# Patient Record
Sex: MALE | Race: WHITE | Employment: OTHER | URBAN - METROPOLITAN AREA
[De-identification: names, ages, dates, MRNs, and addresses within clinical notes are randomized per-mention and may not be internally consistent; named-entity substitution may affect disease eponyms.]

---

## 2017-01-01 ENCOUNTER — HOSPITAL ENCOUNTER (EMERGENCY)
Facility: HOSPITAL | Age: 82
Discharge: HOME | End: 2017-01-01
Attending: EMERGENCY MEDICINE
Payer: MEDICARE

## 2017-01-01 ENCOUNTER — APPOINTMENT (OUTPATIENT)
Dept: GENERAL RADIOLOGY | Facility: HOSPITAL | Age: 82
End: 2017-01-01
Attending: EMERGENCY MEDICINE
Payer: MEDICARE

## 2017-01-01 VITALS
RESPIRATION RATE: 16 BRPM | HEIGHT: 72 IN | WEIGHT: 214 LBS | BODY MASS INDEX: 28.99 KG/M2 | OXYGEN SATURATION: 95 % | TEMPERATURE: 97 F | SYSTOLIC BLOOD PRESSURE: 129 MMHG | DIASTOLIC BLOOD PRESSURE: 66 MMHG | HEART RATE: 50 BPM

## 2017-01-01 VITALS
SYSTOLIC BLOOD PRESSURE: 117 MMHG | HEART RATE: 59 BPM | HEIGHT: 67 IN | DIASTOLIC BLOOD PRESSURE: 53 MMHG | TEMPERATURE: 98 F | WEIGHT: 215 LBS | RESPIRATION RATE: 16 BRPM | OXYGEN SATURATION: 96 % | BODY MASS INDEX: 33.74 KG/M2

## 2017-01-01 DIAGNOSIS — R60.9 PERIPHERAL EDEMA: ICD-10-CM

## 2017-01-01 DIAGNOSIS — T83.9XXA FOLEY CATHETER PROBLEM, INITIAL ENCOUNTER (HCC): Primary | ICD-10-CM

## 2017-01-01 DIAGNOSIS — R31.9 HEMATURIA: Primary | ICD-10-CM

## 2017-01-01 DIAGNOSIS — Z97.8 CHRONIC INDWELLING FOLEY CATHETER: ICD-10-CM

## 2017-01-01 PROCEDURE — 80048 BASIC METABOLIC PNL TOTAL CA: CPT | Performed by: EMERGENCY MEDICINE

## 2017-01-01 PROCEDURE — 81001 URINALYSIS AUTO W/SCOPE: CPT | Performed by: EMERGENCY MEDICINE

## 2017-01-01 PROCEDURE — 87186 SC STD MICRODIL/AGAR DIL: CPT | Performed by: EMERGENCY MEDICINE

## 2017-01-01 PROCEDURE — 87086 URINE CULTURE/COLONY COUNT: CPT | Performed by: EMERGENCY MEDICINE

## 2017-01-01 PROCEDURE — 36415 COLL VENOUS BLD VENIPUNCTURE: CPT

## 2017-01-01 PROCEDURE — 87077 CULTURE AEROBIC IDENTIFY: CPT | Performed by: EMERGENCY MEDICINE

## 2017-01-01 PROCEDURE — 83880 ASSAY OF NATRIURETIC PEPTIDE: CPT | Performed by: EMERGENCY MEDICINE

## 2017-01-01 PROCEDURE — 99283 EMERGENCY DEPT VISIT LOW MDM: CPT

## 2017-01-01 PROCEDURE — 85025 COMPLETE CBC W/AUTO DIFF WBC: CPT | Performed by: EMERGENCY MEDICINE

## 2017-01-01 PROCEDURE — 85027 COMPLETE CBC AUTOMATED: CPT | Performed by: EMERGENCY MEDICINE

## 2017-01-01 PROCEDURE — 71010 XR CHEST AP PORTABLE  (CPT=71010): CPT

## 2017-01-01 PROCEDURE — 85007 BL SMEAR W/DIFF WBC COUNT: CPT | Performed by: EMERGENCY MEDICINE

## 2017-01-01 PROCEDURE — 51702 INSERT TEMP BLADDER CATH: CPT

## 2017-01-01 RX ORDER — LIDOCAINE HYDROCHLORIDE 20 MG/ML
10 JELLY TOPICAL ONCE
Status: DISCONTINUED | OUTPATIENT
Start: 2017-01-01 | End: 2017-01-01

## 2017-01-01 RX ORDER — LIDOCAINE HYDROCHLORIDE 20 MG/ML
10 JELLY TOPICAL ONCE
Status: COMPLETED | OUTPATIENT
Start: 2017-01-01 | End: 2017-01-01

## 2017-01-04 ENCOUNTER — TELEPHONE (OUTPATIENT)
Dept: SURGERY | Facility: CLINIC | Age: 82
End: 2017-01-04

## 2017-01-04 NOTE — TELEPHONE ENCOUNTER
Pts son states pt needs to have catheter changed, last time it was changed was 12/01/16 at [address] [place] [address] ED, requesting an appt sooner than next available.  Pt was seeing another urologist but suffered a fall and is unable to see urologist due to being in a [facility]

## 2017-01-05 NOTE — TELEPHONE ENCOUNTER
I called pt's son back, Allyn Saez and I informed him of the msg from [address] [facility] and we arranged a N/V appt for tues. 1/10 at 3pm and I told him that we will find an o/v appt for pt to see pvk at that time.

## 2017-01-05 NOTE — TELEPHONE ENCOUNTER
Spoke with pt's son and determined that the he has been seeing a Dr. Sebastian Earing out of [facility] and had his holbrook cath was last changed there on 12/1/16.  He states that the office there is very poorly situated and the rooms are very small and crampe

## 2017-01-05 NOTE — TELEPHONE ENCOUNTER
Okay to call patient scheduled for nurse visit catheter change, then visit with me in the near future. Please note that I saw patient at [address] [place] [address] is consult 7/15/16, and at that time, his urologist was Dr. Castillo Romero, [facility].

## 2017-01-10 ENCOUNTER — NURSE ONLY (OUTPATIENT)
Dept: SURGERY | Facility: CLINIC | Age: 82
End: 2017-01-10

## 2017-01-10 DIAGNOSIS — Z97.8 FOLEY CATHETER IN PLACE: Primary | ICD-10-CM

## 2017-01-10 PROCEDURE — 51702 INSERT TEMP BLADDER CATH: CPT | Performed by: UROLOGY

## 2017-01-10 PROCEDURE — G0463 HOSPITAL OUTPT CLINIC VISIT: HCPCS | Performed by: UROLOGY

## 2017-01-10 NOTE — PROGRESS NOTES
Patient's name and  verified. With assistance from patient's son, Yaz Luong, patient transferred from his wheelchair to procedure table. Patient's existing holbrook catheter is noted to be 16 Fr. Urine in holbrook bag is noted to be clear yellow.   Patient's exi

## 2017-01-16 ENCOUNTER — OFFICE VISIT (OUTPATIENT)
Dept: SURGERY | Facility: CLINIC | Age: 82
End: 2017-01-16

## 2017-01-16 VITALS
HEART RATE: 67 BPM | SYSTOLIC BLOOD PRESSURE: 110 MMHG | RESPIRATION RATE: 16 BRPM | DIASTOLIC BLOOD PRESSURE: 64 MMHG | TEMPERATURE: 98 F | OXYGEN SATURATION: 97 %

## 2017-01-16 DIAGNOSIS — N39.0 RECURRENT UTI: ICD-10-CM

## 2017-01-16 DIAGNOSIS — N40.1 BENIGN NON-NODULAR PROSTATIC HYPERPLASIA WITH LOWER URINARY TRACT SYMPTOMS: Primary | ICD-10-CM

## 2017-01-16 DIAGNOSIS — R33.9 URINARY RETENTION: ICD-10-CM

## 2017-01-16 DIAGNOSIS — N31.9 NEUROGENIC BLADDER: ICD-10-CM

## 2017-01-16 DIAGNOSIS — R31.0 GROSS HEMATURIA: ICD-10-CM

## 2017-01-16 DIAGNOSIS — N40.2 PROSTATE NODULE: ICD-10-CM

## 2017-01-16 PROCEDURE — 99214 OFFICE O/P EST MOD 30 MIN: CPT | Performed by: UROLOGY

## 2017-01-16 PROCEDURE — G0463 HOSPITAL OUTPT CLINIC VISIT: HCPCS | Performed by: UROLOGY

## 2017-01-16 RX ORDER — AZITHROMYCIN 500 MG/1
500 TABLET, FILM COATED ORAL DAILY
COMMUNITY
End: 2018-01-01

## 2017-01-16 RX ORDER — OXYBUTYNIN CHLORIDE 5 MG/1
5 TABLET ORAL 3 TIMES DAILY
COMMUNITY
End: 2018-01-01

## 2017-01-16 RX ORDER — DOXEPIN HYDROCHLORIDE 50 MG/1
1 CAPSULE ORAL DAILY
COMMUNITY

## 2017-01-16 RX ORDER — LIDOCAINE 50 MG/G
PATCH TOPICAL EVERY 24 HOURS
COMMUNITY
End: 2018-01-01

## 2017-01-16 RX ORDER — AMOXICILLIN AND CLAVULANATE POTASSIUM 875; 125 MG/1; MG/1
1 TABLET, FILM COATED ORAL 2 TIMES DAILY
COMMUNITY

## 2017-01-16 RX ORDER — POLYETHYLENE GLYCOL 3350 17 G/17G
17 POWDER, FOR SOLUTION ORAL DAILY
COMMUNITY

## 2017-01-16 RX ORDER — DOCUSATE SODIUM 100 MG/1
100 CAPSULE, LIQUID FILLED ORAL 2 TIMES DAILY
COMMUNITY

## 2017-01-16 NOTE — PATIENT INSTRUCTIONS
1.  Please continue finasteride 5 mg daily    2. Please return to office in approximately 3 weeks to have Negrete catheter change by my nurses.       DURING NEXT NURSING VISIT BEGINNING OF FEBRUARY 2017, SPECIFICALLY TO USE A STRAIGHT STANDARD NEGRETE CATHETER

## 2017-01-16 NOTE — PROGRESS NOTES
HPI:    Patient ID: Kandis Del Rio is a 80year old male. HPI     History provided by the patient and his son. 1. Urinary retention   Onset: Patient has had a holbrook since April 2016.  Patient has been visiting our office monthly for holbrook catheter ch Allyn Saez. April 2016 urinary retention; holbrook catheter has been inserted; urologist Dr. Bettye Vargas.  7/17/2016 although physical exam is suspicous for prostatic malignancy, at patient's age of 80 with multiple comorbidities, active gastrointestinal bleed, p Rfl:    multivitamin Oral Tab Take 1 tablet by mouth daily. Disp:  Rfl:    lidocaine 5 % External Patch Place onto the skin daily. Disp:  Rfl:    PEG 3350 Oral Powd Pack Take 17 g by mouth daily.  Disp:  Rfl:    psyllium 28 % Oral Powd Pack Take 1 packet b every 4 (four) hours as needed for Pain. Disp:  Rfl:    HYDROcodone-acetaminophen 5-325 MG Oral Tab Take 1 tablet by mouth every 6 (six) hours as needed for Pain. Disp:  Rfl:    ARTIFICIAL TEAR SOLUTION OP Apply 1 drop to eye as needed.    Disp:  Rfl:    lo bladder. I spent 25 minutes with patient, and majority of this time was spent discussing treatment options, answering questions about treatment and coordinating care.        ASSESSMENT/PLAN:   1. 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 Patient and his son politely declined SOPHY today and chose observation for now. I fully discussed benefits, risks and alternatives to the treatment plan. Patient Instructions and Treatment Plan    1.   Please continue cecy

## 2017-02-03 ENCOUNTER — NURSE ONLY (OUTPATIENT)
Dept: SURGERY | Facility: CLINIC | Age: 82
End: 2017-02-03

## 2017-02-03 DIAGNOSIS — R33.9 URINARY RETENTION: Primary | ICD-10-CM

## 2017-02-03 PROCEDURE — G0463 HOSPITAL OUTPT CLINIC VISIT: HCPCS | Performed by: UROLOGY

## 2017-02-03 NOTE — PROGRESS NOTES
Pt presents in a w/c, accompanied by son. With assistance of son, and nurse Lisbeth Couch, pt was brought into exam room and safely transferred on to exam table.  Pt was then draped for modesty, and current indwelling holbrook catheter balloon was deflated of it's enti

## 2017-04-27 NOTE — ED NOTES
Resting comfortably with family at bedside awaiting disposition. Offers no complaints.  Clear yellow urine via holbrook

## 2017-04-27 NOTE — ED NOTES
16F holbrook removed per MD order. 35cc fluid removed from balloon. Checked to ensure that no residual air or fluids were present. Cath removed, tip intact. Pt tolerated well. Pt c/o suprapubic soreness. heavily soiled diaper changed, collin-care provided.  Pt d

## 2017-04-27 NOTE — ED NOTES
Report to [facility] staff.  Discharged with indwelling holbrook intact with clear yellow returns

## 2017-04-27 NOTE — ED NOTES
Discharged into care of [facility] for transport home with plan to follow up with PCP as indicated. Alert and interactive. Hemodynamically stable. Richard intact with clear yellow urine.

## 2017-04-29 NOTE — PROGRESS NOTES
Nausea and Vomiting: Care Instructions  Your Care Instructions    When you are nauseated, you may feel weak and sweaty and notice a lot of saliva in your mouth. Nausea often leads to vomiting. Most of the time you do not need to worry about nausea and vomiting, but they can be signs of other illnesses. Two common causes of nausea and vomiting are stomach flu and food poisoning. Nausea and vomiting from viral stomach flu will usually start to improve within 24 hours. Nausea and vomiting from food poisoning may last from 12 to 48 hours. The doctor has checked you carefully, but problems can develop later. If you notice any problems or new symptoms, get medical treatment right away. Follow-up care is a key part of your treatment and safety. Be sure to make and go to all appointments, and call your doctor if you are having problems. It's also a good idea to know your test results and keep a list of the medicines you take. How can you care for yourself at home? · To prevent dehydration, drink plenty of fluids, enough so that your urine is light yellow or clear like water. Choose water and other caffeine-free clear liquids until you feel better. If you have kidney, heart, or liver disease and have to limit fluids, talk with your doctor before you increase the amount of fluids you drink. · Rest in bed until you feel better. · When you are able to eat, try clear soups, mild foods, and liquids until all symptoms are gone for 12 to 48 hours. Other good choices include dry toast, crackers, cooked cereal, and gelatin dessert, such as Jell-O. When should you call for help? Call 911 anytime you think you may need emergency care. For example, call if:  · You passed out (lost consciousness). Call your doctor now or seek immediate medical care if:  · You have symptoms of dehydration, such as:  ¨ Dry eyes and a dry mouth. ¨ Passing only a little dark urine.   ¨ Feeling thirstier than usual.  · You have new or worsening Quick Note:    No further intervention needed  ______ belly pain. · You have a new or higher fever. · You vomit blood or what looks like coffee grounds. Watch closely for changes in your health, and be sure to contact your doctor if:  · You have ongoing nausea and vomiting. · Your vomiting is getting worse. · Your vomiting lasts longer than 2 days. · You are not getting better as expected. Where can you learn more? Go to http://riaz-antonia.info/. Enter 25 [id number] in the search box to learn more about \"Nausea and Vomiting: Care Instructions. \"  Current as of: May 27, 2016  Content Version: 11.1  © 1337-2428 [employer]. Care instructions adapted under license by [employer] (which disclaims liability or warranty for this information). If you have questions about a medical condition or this instruction, always ask your healthcare professional. [address] any warranty or liability for your use of this information.

## 2017-04-29 NOTE — ED PROVIDER NOTES
Patient Seen in: [facility] AND [facility] Emergency Department    History   Patient presents with:  Cath Tube Problem (gastrointestinal, urinary, integumentary)    Stated Complaint:     HPI    Patient complains of holbrook catheter not working.   Also having swelli Tab,  Take 81 mg by mouth daily. Cholecalciferol (VITAMIN D3) 20115 UNITS Oral Cap,  Take 1 capsule by mouth daily. finasteride 5 MG Oral Tab,  Take 5 mg by mouth daily. furosemide 40 MG Oral Tab,  Take 40 mg by mouth 2 (two) times daily.  Take 40 mg 04/27/17 1512 95 %   O2 Device 04/27/17 1512 None (Room air)       Current:/53 mmHg  Pulse 59  Temp(Src) 98 °F (36.7 °C)  Resp 16  Ht 170.2 cm (5' 7\")  Wt 97.523 kg  BMI 33.67 kg/m2  SpO2 96%   PULSE OX Nl on room air    GENERAL: awake,alert no dist DIFFERENTIAL WITH PLATELET    Narrative: The following orders were created for panel order CBC WITH DIFFERENTIAL WITH PLATELET.   Procedure                               Abnormality         Status                     ---------

## 2017-06-15 NOTE — ED INITIAL ASSESSMENT (HPI)
Patient complain of hematuria that started today after the nurse at [address] [place] [address].

## 2017-06-15 NOTE — ED NOTES
[facility] notified of patient disposition and transport back to there. Spoke with Amy López RN and advised that patient will require catheter to be irrigated interrmittently throughout the night to avoid any further clots.

## 2017-06-15 NOTE — ED PROVIDER NOTES
Patient Seen in: [facility] AND [facility] Emergency Department    History   Patient presents with:  Bleeding (hematologic)    Stated Complaint: Hematuria    HPI  Patient is from skilled nursing at [facility] because of hematuria.   His Richard was changed today and (four) hours as needed for Wheezing. Amiodarone HCl 100 MG Oral Tab,  Take 200 mg by mouth daily. aspirin 81 MG Oral Tab,  Take 81 mg by mouth daily. Cholecalciferol (VITAMIN D3) 49869 UNITS Oral Cap,  Take 1 capsule by mouth daily.    finasteride 5 06/14/17 2049 128/64 mmHg   Pulse 06/14/17 2049 55   Resp 06/14/17 2049 16   Temp 06/14/17 2049 97.1 °F (36.2 °C)   Temp src 06/14/17 2049 Temporal   SpO2 06/14/17 2049 96 %   O2 Device 06/14/17 2336 None (Room air)       Current:/66 mmHg  Pulse 50 WBC Urine 299 (*)     RBC URINE 1571 (*)     Bacteria Urine Few (*)     All other components within normal limits   CBC W/ DIFFERENTIAL - Abnormal; Notable for the following:     HGB 12.3 (*)     HCT 37.9 (*)     MCV 79.8 (*)     MCH 25.9 (*)     RDW 17.5

## 2018-01-01 ENCOUNTER — APPOINTMENT (OUTPATIENT)
Dept: CV DIAGNOSTICS | Facility: HOSPITAL | Age: 83
DRG: 871 | End: 2018-01-01
Attending: HOSPITALIST
Payer: MEDICARE

## 2018-01-01 ENCOUNTER — APPOINTMENT (OUTPATIENT)
Dept: GENERAL RADIOLOGY | Facility: HOSPITAL | Age: 83
DRG: 871 | End: 2018-01-01
Attending: FAMILY MEDICINE
Payer: MEDICARE

## 2018-01-01 ENCOUNTER — APPOINTMENT (OUTPATIENT)
Dept: ULTRASOUND IMAGING | Facility: HOSPITAL | Age: 83
DRG: 871 | End: 2018-01-01
Attending: INTERNAL MEDICINE
Payer: MEDICARE

## 2018-01-01 ENCOUNTER — HOSPITAL ENCOUNTER (INPATIENT)
Facility: HOSPITAL | Age: 83
LOS: 7 days | DRG: 871 | End: 2018-01-01
Attending: EMERGENCY MEDICINE | Admitting: FAMILY MEDICINE
Payer: MEDICARE

## 2018-01-01 ENCOUNTER — SNF VISIT (OUTPATIENT)
Dept: INTERNAL MEDICINE CLINIC | Facility: SKILLED NURSING FACILITY | Age: 83
End: 2018-01-01

## 2018-01-01 ENCOUNTER — APPOINTMENT (OUTPATIENT)
Dept: GENERAL RADIOLOGY | Facility: HOSPITAL | Age: 83
DRG: 871 | End: 2018-01-01
Attending: INTERNAL MEDICINE
Payer: MEDICARE

## 2018-01-01 ENCOUNTER — APPOINTMENT (OUTPATIENT)
Dept: CT IMAGING | Facility: HOSPITAL | Age: 83
DRG: 871 | End: 2018-01-01
Attending: EMERGENCY MEDICINE
Payer: MEDICARE

## 2018-01-01 ENCOUNTER — HOSPITAL ENCOUNTER (INPATIENT)
Facility: HOSPITAL | Age: 83
LOS: 12 days | Discharge: NURSING, REHAB OR LONG-TERM CARE | DRG: 871 | End: 2018-01-01
Attending: EMERGENCY MEDICINE | Admitting: FAMILY MEDICINE
Payer: MEDICARE

## 2018-01-01 ENCOUNTER — APPOINTMENT (OUTPATIENT)
Dept: GENERAL RADIOLOGY | Facility: HOSPITAL | Age: 83
DRG: 871 | End: 2018-01-01
Attending: EMERGENCY MEDICINE
Payer: MEDICARE

## 2018-01-01 VITALS
TEMPERATURE: 98 F | HEIGHT: 68 IN | DIASTOLIC BLOOD PRESSURE: 69 MMHG | HEART RATE: 94 BPM | WEIGHT: 219 LBS | BODY MASS INDEX: 33.19 KG/M2 | SYSTOLIC BLOOD PRESSURE: 119 MMHG | OXYGEN SATURATION: 95 % | RESPIRATION RATE: 24 BRPM

## 2018-01-01 VITALS
TEMPERATURE: 98 F | HEART RATE: 64 BPM | SYSTOLIC BLOOD PRESSURE: 112 MMHG | OXYGEN SATURATION: 96 % | HEIGHT: 68 IN | DIASTOLIC BLOOD PRESSURE: 58 MMHG | BODY MASS INDEX: 33.14 KG/M2 | WEIGHT: 218.63 LBS | RESPIRATION RATE: 25 BRPM

## 2018-01-01 DIAGNOSIS — R33.8 BENIGN PROSTATIC HYPERPLASIA WITH URINARY RETENTION: ICD-10-CM

## 2018-01-01 DIAGNOSIS — I50.21 ACUTE SYSTOLIC CONGESTIVE HEART FAILURE (HCC): ICD-10-CM

## 2018-01-01 DIAGNOSIS — R09.02 HYPOXIA: ICD-10-CM

## 2018-01-01 DIAGNOSIS — R06.03 RESPIRATORY DISTRESS: Primary | ICD-10-CM

## 2018-01-01 DIAGNOSIS — J69.0 ASPIRATION PNEUMONIA OF LOWER LOBE, UNSPECIFIED ASPIRATION PNEUMONIA TYPE, UNSPECIFIED LATERALITY (HCC): ICD-10-CM

## 2018-01-01 DIAGNOSIS — A41.9 SEVERE SEPSIS (HCC): Primary | ICD-10-CM

## 2018-01-01 DIAGNOSIS — N30.00 ACUTE CYSTITIS WITHOUT HEMATURIA: ICD-10-CM

## 2018-01-01 DIAGNOSIS — N31.9 NEUROGENIC BLADDER: ICD-10-CM

## 2018-01-01 DIAGNOSIS — E87.5 HYPERKALEMIA: ICD-10-CM

## 2018-01-01 DIAGNOSIS — I48.0 PAROXYSMAL ATRIAL FIBRILLATION (HCC): ICD-10-CM

## 2018-01-01 DIAGNOSIS — I26.99 OTHER PULMONARY EMBOLISM WITHOUT ACUTE COR PULMONALE, UNSPECIFIED CHRONICITY (HCC): ICD-10-CM
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
## 2018-01-01 DIAGNOSIS — J96.01 ACUTE RESPIRATORY FAILURE WITH HYPOXIA (HCC): ICD-10-CM

## 2018-01-01 DIAGNOSIS — J18.9 NOSOCOMIAL PNEUMONIA: ICD-10-CM

## 2018-01-01 DIAGNOSIS — N40.1 BENIGN PROSTATIC HYPERPLASIA WITH URINARY RETENTION: ICD-10-CM

## 2018-01-01 DIAGNOSIS — I95.89 OTHER SPECIFIED HYPOTENSION: ICD-10-CM

## 2018-01-01 DIAGNOSIS — Z09 FOLLOW UP: ICD-10-CM

## 2018-01-01 DIAGNOSIS — R65.20 SEVERE SEPSIS (HCC): Primary | ICD-10-CM

## 2018-01-01 DIAGNOSIS — N28.9 ACUTE RENAL INSUFFICIENCY: ICD-10-CM

## 2018-01-01 LAB
ADENOVIRUS PCR:: NEGATIVE
ADENOVIRUS PCR:: NEGATIVE
ALBUMIN SERPL BCP-MCNC: 2.5 G/DL (ref 3.5–4.8)
ALBUMIN SERPL BCP-MCNC: 2.5 G/DL (ref 3.5–4.8)
ALBUMIN SERPL BCP-MCNC: 3.1 G/DL (ref 3.5–4.8)
ALBUMIN SERPL BCP-MCNC: 3.1 G/DL (ref 3.5–4.8)
ALBUMIN/GLOB SERPL: 0.8 {RATIO} (ref 1–2)
ALBUMIN/GLOB SERPL: 0.8 {RATIO} (ref 1–2)
ALBUMIN/GLOB SERPL: 0.9 {RATIO} (ref 1–2)
ALP SERPL-CCNC: 30 U/L (ref 32–100)
ALP SERPL-CCNC: 42 U/L (ref 32–100)
ALP SERPL-CCNC: 49 U/L (ref 32–100)
ALP SERPL-CCNC: 50 U/L (ref 32–100)
ALT SERPL-CCNC: 6 U/L (ref 17–63)
ALT SERPL-CCNC: 6 U/L (ref 17–63)
ALT SERPL-CCNC: <5 U/L (ref 17–63)
ALT SERPL-CCNC: <5 U/L (ref 17–63)
ANION GAP SERPL CALC-SCNC: 10 MMOL/L (ref 0–18)
ANION GAP SERPL CALC-SCNC: 11 MMOL/L (ref 0–18)
ANION GAP SERPL CALC-SCNC: 11 MMOL/L (ref 0–18)
ANION GAP SERPL CALC-SCNC: 12 MMOL/L (ref 0–18)
ANION GAP SERPL CALC-SCNC: 13 MMOL/L (ref 0–18)
ANION GAP SERPL CALC-SCNC: 5 MMOL/L (ref 0–18)
ANION GAP SERPL CALC-SCNC: 5 MMOL/L (ref 0–18)
ANION GAP SERPL CALC-SCNC: 6 MMOL/L (ref 0–18)
ANION GAP SERPL CALC-SCNC: 7 MMOL/L (ref 0–18)
ANION GAP SERPL CALC-SCNC: 7 MMOL/L (ref 0–18)
ANION GAP SERPL CALC-SCNC: 9 MMOL/L (ref 0–18)
ANION GAP SERPL CALC-SCNC: 9 MMOL/L (ref 0–18)
APTT PPP: 104.6 SECONDS (ref 23.2–35.3)
APTT PPP: 31.4 SECONDS (ref 23.2–35.3)
APTT PPP: 34.7 SECONDS (ref 23.2–35.3)
APTT PPP: >240 SECONDS (ref 23.2–35.3)
AST SERPL-CCNC: 13 U/L (ref 15–41)
AST SERPL-CCNC: 18 U/L (ref 15–41)
AST SERPL-CCNC: 19 U/L (ref 15–41)
AST SERPL-CCNC: 21 U/L (ref 15–41)
B PERT DNA SPEC QL NAA+PROBE: NEGATIVE
B PERT DNA SPEC QL NAA+PROBE: NEGATIVE
BASE EXCESS BLD CALC-SCNC: 0.4 MMOL/L (ref ?–2)
BASOPHILS # BLD: 0 K/UL (ref 0–0.2)
BASOPHILS # BLD: 0.1 K/UL (ref 0–0.2)
BASOPHILS NFR BLD: 0 %
BASOPHILS NFR BLD: 0 %
BASOPHILS NFR BLD: 1 %
BILIRUB DIRECT SERPL-MCNC: 0.1 MG/DL (ref 0–0.2)
BILIRUB SERPL-MCNC: 0.5 MG/DL (ref 0.3–1.2)
BILIRUB SERPL-MCNC: 0.5 MG/DL (ref 0.3–1.2)
BILIRUB SERPL-MCNC: 0.6 MG/DL (ref 0.3–1.2)
BILIRUB SERPL-MCNC: 0.7 MG/DL (ref 0.3–1.2)
BILIRUB UR QL: NEGATIVE
BILIRUB UR QL: NEGATIVE
BNP SERPL-MCNC: 128 PG/ML (ref 0–100)
BNP SERPL-MCNC: 69 PG/ML (ref 0–100)
BUN SERPL-MCNC: 22 MG/DL (ref 8–20)
BUN SERPL-MCNC: 22 MG/DL (ref 8–20)
BUN SERPL-MCNC: 25 MG/DL (ref 8–20)
BUN SERPL-MCNC: 26 MG/DL (ref 8–20)
BUN SERPL-MCNC: 29 MG/DL (ref 8–20)
BUN SERPL-MCNC: 29 MG/DL (ref 8–20)
BUN SERPL-MCNC: 31 MG/DL (ref 8–20)
BUN SERPL-MCNC: 31 MG/DL (ref 8–20)
BUN SERPL-MCNC: 33 MG/DL (ref 8–20)
BUN SERPL-MCNC: 34 MG/DL (ref 8–20)
BUN SERPL-MCNC: 41 MG/DL (ref 8–20)
BUN SERPL-MCNC: 50 MG/DL (ref 8–20)
BUN SERPL-MCNC: 53 MG/DL (ref 8–20)
BUN SERPL-MCNC: 53 MG/DL (ref 8–20)
BUN/CREAT SERPL: 23.2 (ref 10–20)
BUN/CREAT SERPL: 23.8 (ref 10–20)
BUN/CREAT SERPL: 23.9 (ref 10–20)
BUN/CREAT SERPL: 25 (ref 10–20)
BUN/CREAT SERPL: 28.1 (ref 10–20)
BUN/CREAT SERPL: 28.3 (ref 10–20)
BUN/CREAT SERPL: 30.2 (ref 10–20)
BUN/CREAT SERPL: 31.6 (ref 10–20)
BUN/CREAT SERPL: 32.3 (ref 10–20)
BUN/CREAT SERPL: 32.3 (ref 10–20)
BUN/CREAT SERPL: 33.1 (ref 10–20)
BUN/CREAT SERPL: 39 (ref 10–20)
C PNEUM DNA SPEC QL NAA+PROBE: NEGATIVE
C PNEUM DNA SPEC QL NAA+PROBE: NEGATIVE
CALCIUM SERPL-MCNC: 8.6 MG/DL (ref 8.5–10.5)
CALCIUM SERPL-MCNC: 8.6 MG/DL (ref 8.5–10.5)
CALCIUM SERPL-MCNC: 8.7 MG/DL (ref 8.5–10.5)
CALCIUM SERPL-MCNC: 8.9 MG/DL (ref 8.5–10.5)
CALCIUM SERPL-MCNC: 9 MG/DL (ref 8.5–10.5)
CALCIUM SERPL-MCNC: 9.2 MG/DL (ref 8.5–10.5)
CALCIUM SERPL-MCNC: 9.2 MG/DL (ref 8.5–10.5)
CALCIUM SERPL-MCNC: 9.4 MG/DL (ref 8.5–10.5)
CALCIUM SERPL-MCNC: 9.5 MG/DL (ref 8.5–10.5)
CALCIUM SERPL-MCNC: 9.6 MG/DL (ref 8.5–10.5)
CALCIUM SERPL-MCNC: 9.6 MG/DL (ref 8.5–10.5)
CALCIUM SERPL-MCNC: 9.7 MG/DL (ref 8.5–10.5)
CALCIUM SERPL-MCNC: 9.8 MG/DL (ref 8.5–10.5)
CALCIUM SERPL-MCNC: 9.9 MG/DL (ref 8.5–10.5)
CHLORIDE SERPL-SCNC: 102 MMOL/L (ref 95–110)
CHLORIDE SERPL-SCNC: 103 MMOL/L (ref 95–110)
CHLORIDE SERPL-SCNC: 106 MMOL/L (ref 95–110)
CHLORIDE SERPL-SCNC: 107 MMOL/L (ref 95–110)
CHLORIDE SERPL-SCNC: 109 MMOL/L (ref 95–110)
CHLORIDE SERPL-SCNC: 110 MMOL/L (ref 95–110)
CHLORIDE SERPL-SCNC: 111 MMOL/L (ref 95–110)
CHLORIDE SERPL-SCNC: 113 MMOL/L (ref 95–110)
CHLORIDE SERPL-SCNC: 118 MMOL/L (ref 95–110)
CHLORIDE SERPL-SCNC: 120 MMOL/L (ref 95–110)
CHLORIDE SERPL-SCNC: 122 MMOL/L (ref 95–110)
CHLORIDE SERPL-SCNC: 122 MMOL/L (ref 95–110)
CHOLEST SERPL-MCNC: 207 MG/DL (ref 110–200)
CO2 SERPL-SCNC: 21 MMOL/L (ref 22–32)
CO2 SERPL-SCNC: 24 MMOL/L (ref 22–32)
CO2 SERPL-SCNC: 25 MMOL/L (ref 22–32)
CO2 SERPL-SCNC: 26 MMOL/L (ref 22–32)
CO2 SERPL-SCNC: 26 MMOL/L (ref 22–32)
CO2 SERPL-SCNC: 27 MMOL/L (ref 22–32)
CO2 SERPL-SCNC: 28 MMOL/L (ref 22–32)
CO2 SERPL-SCNC: 29 MMOL/L (ref 22–32)
COLOR UR: YELLOW
COLOR UR: YELLOW
CORONAVIRUS 229E PCR:: NEGATIVE
CORONAVIRUS 229E PCR:: NEGATIVE
CORONAVIRUS HKU1 PCR:: NEGATIVE
CORONAVIRUS HKU1 PCR:: NEGATIVE
CORONAVIRUS NL63 PCR:: NEGATIVE
CORONAVIRUS NL63 PCR:: NEGATIVE
CORONAVIRUS OC43 PCR:: NEGATIVE
CORONAVIRUS OC43 PCR:: NEGATIVE
CREAT SERPL-MCNC: 0.88 MG/DL (ref 0.5–1.5)
CREAT SERPL-MCNC: 0.89 MG/DL (ref 0.5–1.5)
CREAT SERPL-MCNC: 0.95 MG/DL (ref 0.5–1.5)
CREAT SERPL-MCNC: 0.96 MG/DL (ref 0.5–1.5)
CREAT SERPL-MCNC: 0.96 MG/DL (ref 0.5–1.5)
CREAT SERPL-MCNC: 1.05 MG/DL (ref 0.5–1.5)
CREAT SERPL-MCNC: 1.09 MG/DL (ref 0.5–1.5)
CREAT SERPL-MCNC: 1.2 MG/DL (ref 0.5–1.5)
CREAT SERPL-MCNC: 1.25 MG/DL (ref 0.5–1.5)
CREAT SERPL-MCNC: 1.3 MG/DL (ref 0.5–1.5)
CREAT SERPL-MCNC: 1.42 MG/DL (ref 0.5–1.5)
CREAT SERPL-MCNC: 1.58 MG/DL (ref 0.5–1.5)
CREAT SERPL-MCNC: 1.6 MG/DL (ref 0.5–1.5)
CREAT SERPL-MCNC: 1.64 MG/DL (ref 0.5–1.5)
D DIMER PPP FEU-MCNC: 3.01 MCG/ML (ref ?–0.91)
EOSINOPHIL # BLD: 0 K/UL (ref 0–0.7)
EOSINOPHIL # BLD: 0.1 K/UL (ref 0–0.7)
EOSINOPHIL # BLD: 0.1 K/UL (ref 0–0.7)
EOSINOPHIL # BLD: 0.2 K/UL (ref 0–0.7)
EOSINOPHIL NFR BLD: 0 %
EOSINOPHIL NFR BLD: 1 %
EOSINOPHIL NFR BLD: 2 %
EOSINOPHIL NFR BLD: 3 %
ERYTHROCYTE [DISTWIDTH] IN BLOOD BY AUTOMATED COUNT: 16.6 % (ref 11–15)
ERYTHROCYTE [DISTWIDTH] IN BLOOD BY AUTOMATED COUNT: 16.6 % (ref 11–15)
ERYTHROCYTE [DISTWIDTH] IN BLOOD BY AUTOMATED COUNT: 16.7 % (ref 11–15)
ERYTHROCYTE [DISTWIDTH] IN BLOOD BY AUTOMATED COUNT: 16.9 % (ref 11–15)
ERYTHROCYTE [DISTWIDTH] IN BLOOD BY AUTOMATED COUNT: 16.9 % (ref 11–15)
ERYTHROCYTE [DISTWIDTH] IN BLOOD BY AUTOMATED COUNT: 17 % (ref 11–15)
ERYTHROCYTE [DISTWIDTH] IN BLOOD BY AUTOMATED COUNT: 17.4 % (ref 11–15)
ERYTHROCYTE [DISTWIDTH] IN BLOOD BY AUTOMATED COUNT: 18 % (ref 11–15)
ERYTHROCYTE [DISTWIDTH] IN BLOOD BY AUTOMATED COUNT: 18 % (ref 11–15)
ERYTHROCYTE [DISTWIDTH] IN BLOOD BY AUTOMATED COUNT: 18.3 % (ref 11–15)
ERYTHROCYTE [DISTWIDTH] IN BLOOD BY AUTOMATED COUNT: 18.4 % (ref 11–15)
ERYTHROCYTE [DISTWIDTH] IN BLOOD BY AUTOMATED COUNT: 18.5 % (ref 11–15)
ERYTHROCYTE [DISTWIDTH] IN BLOOD BY AUTOMATED COUNT: 18.8 % (ref 11–15)
FLUAV H3 RNA SPEC QL NAA+PROBE: POSITIVE
FLUAV RNA SPEC QL NAA+PROBE: NEGATIVE
FLUBV RNA SPEC QL NAA+PROBE: NEGATIVE
FLUBV RNA SPEC QL NAA+PROBE: NEGATIVE
GLOBULIN PLAS-MCNC: 3.1 G/DL (ref 2.5–3.7)
GLOBULIN PLAS-MCNC: 3.2 G/DL (ref 2.5–3.7)
GLOBULIN PLAS-MCNC: 3.5 G/DL (ref 2.5–3.7)
GLUCOSE SERPL-MCNC: 101 MG/DL (ref 70–99)
GLUCOSE SERPL-MCNC: 102 MG/DL (ref 70–99)
GLUCOSE SERPL-MCNC: 103 MG/DL (ref 70–99)
GLUCOSE SERPL-MCNC: 104 MG/DL (ref 70–99)
GLUCOSE SERPL-MCNC: 105 MG/DL (ref 70–99)
GLUCOSE SERPL-MCNC: 114 MG/DL (ref 70–99)
GLUCOSE SERPL-MCNC: 117 MG/DL (ref 70–99)
GLUCOSE SERPL-MCNC: 118 MG/DL (ref 70–99)
GLUCOSE SERPL-MCNC: 127 MG/DL (ref 70–99)
GLUCOSE SERPL-MCNC: 146 MG/DL (ref 70–99)
GLUCOSE SERPL-MCNC: 147 MG/DL (ref 70–99)
GLUCOSE SERPL-MCNC: 154 MG/DL (ref 70–99)
GLUCOSE SERPL-MCNC: 180 MG/DL (ref 70–99)
GLUCOSE SERPL-MCNC: 95 MG/DL (ref 70–99)
GLUCOSE UR-MCNC: NEGATIVE MG/DL
GLUCOSE UR-MCNC: NEGATIVE MG/DL
HCO3 BLDA-SCNC: 26.4 MEQ/L (ref 21–27)
HCT VFR BLD AUTO: 31.8 % (ref 41–52)
HCT VFR BLD AUTO: 32.1 % (ref 41–52)
HCT VFR BLD AUTO: 32.7 % (ref 41–52)
HCT VFR BLD AUTO: 32.9 % (ref 41–52)
HCT VFR BLD AUTO: 34.3 % (ref 41–52)
HCT VFR BLD AUTO: 35.6 % (ref 41–52)
HCT VFR BLD AUTO: 35.8 % (ref 41–52)
HCT VFR BLD AUTO: 36 % (ref 41–52)
HCT VFR BLD AUTO: 36.8 % (ref 41–52)
HCT VFR BLD AUTO: 37.9 % (ref 41–52)
HCT VFR BLD AUTO: 39.7 % (ref 41–52)
HCT VFR BLD AUTO: 43 % (ref 41–52)
HCT VFR BLD AUTO: 45.3 % (ref 41–52)
HDLC SERPL-MCNC: 37 MG/DL
HGB BLD-MCNC: 10 G/DL (ref 13.5–17.5)
HGB BLD-MCNC: 10 G/DL (ref 13.5–17.5)
HGB BLD-MCNC: 10.4 G/DL (ref 13.5–17.5)
HGB BLD-MCNC: 10.5 G/DL (ref 13.5–17.5)
HGB BLD-MCNC: 11 G/DL (ref 13.5–17.5)
HGB BLD-MCNC: 11.2 G/DL (ref 13.5–17.5)
HGB BLD-MCNC: 11.4 G/DL (ref 13.5–17.5)
HGB BLD-MCNC: 11.5 G/DL (ref 13.5–17.5)
HGB BLD-MCNC: 11.6 G/DL (ref 13.5–17.5)
HGB BLD-MCNC: 12.1 G/DL (ref 13.5–17.5)
HGB BLD-MCNC: 12.7 G/DL (ref 13.5–17.5)
HGB BLD-MCNC: 13.4 G/DL (ref 13.5–17.5)
HGB BLD-MCNC: 14.3 G/DL (ref 13.5–17.5)
HYALINE CASTS #/AREA URNS AUTO: 7 /LPF
INR BLD: 1.2 (ref 0.9–1.2)
INR BLD: 1.2 (ref 0.9–1.2)
INR BLD: 1.6 (ref 0.9–1.2)
LACTATE SERPL-SCNC: 1.9 MMOL/L (ref 0.5–2.2)
LACTATE SERPL-SCNC: 2 MMOL/L (ref 0.5–2.2)
LACTATE SERPL-SCNC: 2.2 MMOL/L (ref 0.5–2.2)
LACTATE SERPL-SCNC: 2.4 MMOL/L (ref 0.5–2.2)
LACTATE SERPL-SCNC: 2.9 MMOL/L (ref 0.5–2.2)
LACTATE SERPL-SCNC: 3.2 MMOL/L (ref 0.5–2.2)
LACTATE SERPL-SCNC: 4.6 MMOL/L (ref 0.5–2.2)
LDLC SERPL CALC-MCNC: 150 MG/DL (ref 0–99)
LYMPHOCYTES # BLD: 0.3 K/UL (ref 1–4)
LYMPHOCYTES # BLD: 0.4 K/UL (ref 1–4)
LYMPHOCYTES # BLD: 0.5 K/UL (ref 1–4)
LYMPHOCYTES # BLD: 0.6 K/UL (ref 1–4)
LYMPHOCYTES # BLD: 1 K/UL (ref 1–4)
LYMPHOCYTES NFR BLD: 10 %
LYMPHOCYTES NFR BLD: 13 %
LYMPHOCYTES NFR BLD: 3 %
LYMPHOCYTES NFR BLD: 4 %
LYMPHOCYTES NFR BLD: 5 %
LYMPHOCYTES NFR BLD: 6 %
LYMPHOCYTES NFR BLD: 7 %
LYMPHOCYTES NFR BLD: 9 %
MAGNESIUM SERPL-MCNC: 2.4 MG/DL (ref 1.8–2.5)
MAGNESIUM SERPL-MCNC: 2.6 MG/DL (ref 1.8–2.5)
MCH RBC QN AUTO: 25.9 PG (ref 27–32)
MCH RBC QN AUTO: 25.9 PG (ref 27–32)
MCH RBC QN AUTO: 26.2 PG (ref 27–32)
MCH RBC QN AUTO: 26.3 PG (ref 27–32)
MCH RBC QN AUTO: 26.4 PG (ref 27–32)
MCH RBC QN AUTO: 26.4 PG (ref 27–32)
MCH RBC QN AUTO: 26.6 PG (ref 27–32)
MCH RBC QN AUTO: 26.7 PG (ref 27–32)
MCH RBC QN AUTO: 26.8 PG (ref 27–32)
MCHC RBC AUTO-ENTMCNC: 31 G/DL (ref 32–37)
MCHC RBC AUTO-ENTMCNC: 31.2 G/DL (ref 32–37)
MCHC RBC AUTO-ENTMCNC: 31.3 G/DL (ref 32–37)
MCHC RBC AUTO-ENTMCNC: 31.3 G/DL (ref 32–37)
MCHC RBC AUTO-ENTMCNC: 31.5 G/DL (ref 32–37)
MCHC RBC AUTO-ENTMCNC: 31.5 G/DL (ref 32–37)
MCHC RBC AUTO-ENTMCNC: 31.6 G/DL (ref 32–37)
MCHC RBC AUTO-ENTMCNC: 31.8 G/DL (ref 32–37)
MCHC RBC AUTO-ENTMCNC: 32 G/DL (ref 32–37)
MCHC RBC AUTO-ENTMCNC: 32 G/DL (ref 32–37)
MCHC RBC AUTO-ENTMCNC: 32.1 G/DL (ref 32–37)
MCHC RBC AUTO-ENTMCNC: 32.1 G/DL (ref 32–37)
MCHC RBC AUTO-ENTMCNC: 32.5 G/DL (ref 32–37)
MCV RBC AUTO: 81.5 FL (ref 80–100)
MCV RBC AUTO: 81.6 FL (ref 80–100)
MCV RBC AUTO: 81.8 FL (ref 80–100)
MCV RBC AUTO: 82 FL (ref 80–100)
MCV RBC AUTO: 82.3 FL (ref 80–100)
MCV RBC AUTO: 82.4 FL (ref 80–100)
MCV RBC AUTO: 83.3 FL (ref 80–100)
MCV RBC AUTO: 83.6 FL (ref 80–100)
MCV RBC AUTO: 83.7 FL (ref 80–100)
MCV RBC AUTO: 83.7 FL (ref 80–100)
MCV RBC AUTO: 84.5 FL (ref 80–100)
MCV RBC AUTO: 85.5 FL (ref 80–100)
MCV RBC AUTO: 85.6 FL (ref 80–100)
METAPNEUMOVIRUS PCR:: NEGATIVE
METAPNEUMOVIRUS PCR:: NEGATIVE
MODIFIED ALLEN TEST: POSITIVE
MONOCYTES # BLD: 0.3 K/UL (ref 0–1)
MONOCYTES # BLD: 0.4 K/UL (ref 0–1)
MONOCYTES # BLD: 0.4 K/UL (ref 0–1)
MONOCYTES # BLD: 0.5 K/UL (ref 0–1)
MONOCYTES # BLD: 0.6 K/UL (ref 0–1)
MONOCYTES # BLD: 0.6 K/UL (ref 0–1)
MONOCYTES # BLD: 0.7 K/UL (ref 0–1)
MONOCYTES # BLD: 0.7 K/UL (ref 0–1)
MONOCYTES NFR BLD: 10 %
MONOCYTES NFR BLD: 4 %
MONOCYTES NFR BLD: 4 %
MONOCYTES NFR BLD: 5 %
MONOCYTES NFR BLD: 6 %
MONOCYTES NFR BLD: 6 %
MONOCYTES NFR BLD: 7 %
MONOCYTES NFR BLD: 8 %
MONOCYTES NFR BLD: 8 %
MRSA DNA SPEC QL NAA+PROBE: NEGATIVE
MRSA DNA SPEC QL NAA+PROBE: NEGATIVE
MYCOPLASMA PNEUMONIA PCR:: NEGATIVE
MYCOPLASMA PNEUMONIA PCR:: NEGATIVE
NEUTROPHILS # BLD AUTO: 10 K/UL (ref 1.8–7.7)
NEUTROPHILS # BLD AUTO: 10.1 K/UL (ref 1.8–7.7)
NEUTROPHILS # BLD AUTO: 4.4 K/UL (ref 1.8–7.7)
NEUTROPHILS # BLD AUTO: 5.1 K/UL (ref 1.8–7.7)
NEUTROPHILS # BLD AUTO: 5.3 K/UL (ref 1.8–7.7)
NEUTROPHILS # BLD AUTO: 5.7 K/UL (ref 1.8–7.7)
NEUTROPHILS # BLD AUTO: 5.9 K/UL (ref 1.8–7.7)
NEUTROPHILS # BLD AUTO: 6 K/UL (ref 1.8–7.7)
NEUTROPHILS # BLD AUTO: 6.1 K/UL (ref 1.8–7.7)
NEUTROPHILS # BLD AUTO: 6.5 K/UL (ref 1.8–7.7)
NEUTROPHILS # BLD AUTO: 6.9 K/UL (ref 1.8–7.7)
NEUTROPHILS # BLD AUTO: 7.4 K/UL (ref 1.8–7.7)
NEUTROPHILS # BLD AUTO: 9.3 K/UL (ref 1.8–7.7)
NEUTROPHILS NFR BLD: 79 %
NEUTROPHILS NFR BLD: 80 %
NEUTROPHILS NFR BLD: 81 %
NEUTROPHILS NFR BLD: 84 %
NEUTROPHILS NFR BLD: 84 %
NEUTROPHILS NFR BLD: 85 %
NEUTROPHILS NFR BLD: 85 %
NEUTROPHILS NFR BLD: 86 %
NEUTROPHILS NFR BLD: 88 %
NEUTROPHILS NFR BLD: 89 %
NEUTROPHILS NFR BLD: 90 %
NEUTROPHILS NFR BLD: 90 %
NEUTROPHILS NFR BLD: 91 %
NITRITE UR QL STRIP.AUTO: NEGATIVE
NITRITE UR QL STRIP.AUTO: POSITIVE
NONHDLC SERPL-MCNC: 170 MG/DL
O2 CT BLD-SCNC: 17.6 VOL% (ref 15–23)
OSMOLALITY UR CALC.SUM OF ELEC: 293 MOSM/KG (ref 275–295)
OSMOLALITY UR CALC.SUM OF ELEC: 296 MOSM/KG (ref 275–295)
OSMOLALITY UR CALC.SUM OF ELEC: 299 MOSM/KG (ref 275–295)
OSMOLALITY UR CALC.SUM OF ELEC: 303 MOSM/KG (ref 275–295)
OSMOLALITY UR CALC.SUM OF ELEC: 307 MOSM/KG (ref 275–295)
OSMOLALITY UR CALC.SUM OF ELEC: 310 MOSM/KG (ref 275–295)
OSMOLALITY UR CALC.SUM OF ELEC: 314 MOSM/KG (ref 275–295)
OSMOLALITY UR CALC.SUM OF ELEC: 315 MOSM/KG (ref 275–295)
OSMOLALITY UR CALC.SUM OF ELEC: 315 MOSM/KG (ref 275–295)
OSMOLALITY UR CALC.SUM OF ELEC: 316 MOSM/KG (ref 275–295)
OSMOLALITY UR CALC.SUM OF ELEC: 316 MOSM/KG (ref 275–295)
OSMOLALITY UR CALC.SUM OF ELEC: 319 MOSM/KG (ref 275–295)
OSMOLALITY UR CALC.SUM OF ELEC: 323 MOSM/KG (ref 275–295)
OSMOLALITY UR CALC.SUM OF ELEC: 323 MOSM/KG (ref 275–295)
OXYGEN LITERS/MINUTE: 8 L/MIN
PARAINFLUENZA 1 PCR:: NEGATIVE
PARAINFLUENZA 1 PCR:: NEGATIVE
PARAINFLUENZA 2 PCR:: NEGATIVE
PARAINFLUENZA 2 PCR:: NEGATIVE
PARAINFLUENZA 3 PCR:: NEGATIVE
PARAINFLUENZA 3 PCR:: NEGATIVE
PARAINFLUENZA 4 PCR:: NEGATIVE
PARAINFLUENZA 4 PCR:: NEGATIVE
PCO2 BLDA: 51 MM HG (ref 35–45)
PH BLDA: 7.34 [PH] (ref 7.35–7.45)
PH UR: 5 [PH] (ref 5–8)
PH UR: 6 [PH] (ref 5–8)
PLATELET # BLD AUTO: 154 K/UL (ref 140–400)
PLATELET # BLD AUTO: 161 K/UL (ref 140–400)
PLATELET # BLD AUTO: 161 K/UL (ref 140–400)
PLATELET # BLD AUTO: 164 K/UL (ref 140–400)
PLATELET # BLD AUTO: 169 K/UL (ref 140–400)
PLATELET # BLD AUTO: 176 K/UL (ref 140–400)
PLATELET # BLD AUTO: 186 K/UL (ref 140–400)
PLATELET # BLD AUTO: 192 K/UL (ref 140–400)
PLATELET # BLD AUTO: 200 K/UL (ref 140–400)
PLATELET # BLD AUTO: 208 K/UL (ref 140–400)
PLATELET # BLD AUTO: 209 K/UL (ref 140–400)
PLATELET # BLD AUTO: 236 K/UL (ref 140–400)
PLATELET # BLD AUTO: 323 K/UL (ref 140–400)
PMV BLD AUTO: 10.4 FL (ref 7.4–10.3)
PMV BLD AUTO: 8.4 FL (ref 7.4–10.3)
PMV BLD AUTO: 8.5 FL (ref 7.4–10.3)
PMV BLD AUTO: 8.8 FL (ref 7.4–10.3)
PMV BLD AUTO: 9.1 FL (ref 7.4–10.3)
PMV BLD AUTO: 9.2 FL (ref 7.4–10.3)
PMV BLD AUTO: 9.3 FL (ref 7.4–10.3)
PMV BLD AUTO: 9.4 FL (ref 7.4–10.3)
PMV BLD AUTO: 9.4 FL (ref 7.4–10.3)
PMV BLD AUTO: 9.5 FL (ref 7.4–10.3)
PMV BLD AUTO: 9.5 FL (ref 7.4–10.3)
PMV BLD AUTO: 9.7 FL (ref 7.4–10.3)
PMV BLD AUTO: 9.8 FL (ref 7.4–10.3)
PO2 BLDA: 74 MM HG (ref 80–100)
PO2 SATN ADJ TO 0.5 BLD: 23 MMHG (ref 24–28)
POTASSIUM SERPL-SCNC: 3.2 MMOL/L (ref 3.3–5.1)
POTASSIUM SERPL-SCNC: 3.4 MMOL/L (ref 3.3–5.1)
POTASSIUM SERPL-SCNC: 3.4 MMOL/L (ref 3.3–5.1)
POTASSIUM SERPL-SCNC: 3.5 MMOL/L (ref 3.3–5.1)
POTASSIUM SERPL-SCNC: 3.6 MMOL/L (ref 3.3–5.1)
POTASSIUM SERPL-SCNC: 3.7 MMOL/L (ref 3.3–5.1)
POTASSIUM SERPL-SCNC: 3.8 MMOL/L (ref 3.3–5.1)
POTASSIUM SERPL-SCNC: 3.9 MMOL/L (ref 3.3–5.1)
POTASSIUM SERPL-SCNC: 3.9 MMOL/L (ref 3.3–5.1)
POTASSIUM SERPL-SCNC: 4 MMOL/L (ref 3.3–5.1)
POTASSIUM SERPL-SCNC: 4.1 MMOL/L (ref 3.3–5.1)
POTASSIUM SERPL-SCNC: 4.2 MMOL/L (ref 3.3–5.1)
POTASSIUM SERPL-SCNC: 4.8 MMOL/L (ref 3.3–5.1)
POTASSIUM SERPL-SCNC: 4.9 MMOL/L (ref 3.3–5.1)
POTASSIUM SERPL-SCNC: 5 MMOL/L (ref 3.3–5.1)
POTASSIUM SERPL-SCNC: 5.1 MMOL/L (ref 3.3–5.1)
PROCALCITONIN SERPL-MCNC: 0.68 NG/ML (ref ?–0.11)
PROT SERPL-MCNC: 5.6 G/DL (ref 5.9–8.4)
PROT SERPL-MCNC: 5.7 G/DL (ref 5.9–8.4)
PROT SERPL-MCNC: 6.5 G/DL (ref 5.9–8.4)
PROT SERPL-MCNC: 6.6 G/DL (ref 5.9–8.4)
PROT UR-MCNC: 100 MG/DL
PROT UR-MCNC: 30 MG/DL
PROTHROMBIN TIME: 15 SECONDS (ref 11.8–14.5)
PROTHROMBIN TIME: 15.1 SECONDS (ref 11.8–14.5)
PROTHROMBIN TIME: 18.2 SECONDS (ref 11.8–14.5)
PUNCTURE CHARGE: YES
RBC # BLD AUTO: 3.8 M/UL (ref 4.5–5.9)
RBC # BLD AUTO: 3.83 M/UL (ref 4.5–5.9)
RBC # BLD AUTO: 3.89 M/UL (ref 4.5–5.9)
RBC # BLD AUTO: 3.93 M/UL (ref 4.5–5.9)
RBC # BLD AUTO: 4.21 M/UL (ref 4.5–5.9)
RBC # BLD AUTO: 4.31 M/UL (ref 4.5–5.9)
RBC # BLD AUTO: 4.34 M/UL (ref 4.5–5.9)
RBC # BLD AUTO: 4.38 M/UL (ref 4.5–5.9)
RBC # BLD AUTO: 4.42 M/UL (ref 4.5–5.9)
RBC # BLD AUTO: 4.6 M/UL (ref 4.5–5.9)
RBC # BLD AUTO: 4.83 M/UL (ref 4.5–5.9)
RBC # BLD AUTO: 5.03 M/UL (ref 4.5–5.9)
RBC # BLD AUTO: 5.42 M/UL (ref 4.5–5.9)
RBC #/AREA URNS AUTO: 54 /HPF
RBC #/AREA URNS AUTO: 9 /HPF
RHINOVIRUS/ENTERO PCR:: NEGATIVE
RHINOVIRUS/ENTERO PCR:: NEGATIVE
RSV RNA SPEC QL NAA+PROBE: NEGATIVE
RSV RNA SPEC QL NAA+PROBE: NEGATIVE
SAO2 % BLDA: 96 % (ref 94–100)
SODIUM SERPL-SCNC: 137 MMOL/L (ref 136–144)
SODIUM SERPL-SCNC: 138 MMOL/L (ref 136–144)
SODIUM SERPL-SCNC: 141 MMOL/L (ref 136–144)
SODIUM SERPL-SCNC: 143 MMOL/L (ref 136–144)
SODIUM SERPL-SCNC: 144 MMOL/L (ref 136–144)
SODIUM SERPL-SCNC: 146 MMOL/L (ref 136–144)
SODIUM SERPL-SCNC: 148 MMOL/L (ref 136–144)
SODIUM SERPL-SCNC: 148 MMOL/L (ref 136–144)
SODIUM SERPL-SCNC: 151 MMOL/L (ref 136–144)
SODIUM SERPL-SCNC: 151 MMOL/L (ref 136–144)
SODIUM SERPL-SCNC: 153 MMOL/L (ref 136–144)
SODIUM SERPL-SCNC: 154 MMOL/L (ref 136–144)
SP GR UR STRIP: 1.02 (ref 1–1.03)
SP GR UR STRIP: 1.02 (ref 1–1.03)
TRIGL SERPL-MCNC: 99 MG/DL (ref 1–149)
TROPONIN I SERPL-MCNC: 0.02 NG/ML (ref ?–0.03)
TROPONIN I SERPL-MCNC: 0.04 NG/ML (ref ?–0.03)
TROPONIN I SERPL-MCNC: 0.05 NG/ML (ref ?–0.03)
TROPONIN I SERPL-MCNC: 0.05 NG/ML (ref ?–0.03)
UROBILINOGEN UR STRIP-ACNC: <2
UROBILINOGEN UR STRIP-ACNC: <2
VANCOMYCIN TROUGH SERPL-MCNC: 18.3 MCG/ML (ref 10–20)
VANCOMYCIN TROUGH SERPL-MCNC: 21.8 MCG/ML (ref 10–20)
VIT C UR-MCNC: 20 MG/DL
VIT C UR-MCNC: NEGATIVE MG/DL
WBC # BLD AUTO: 10.3 K/UL (ref 4–11)
WBC # BLD AUTO: 11.1 K/UL (ref 4–11)
WBC # BLD AUTO: 11.1 K/UL (ref 4–11)
WBC # BLD AUTO: 5.5 K/UL (ref 4–11)
WBC # BLD AUTO: 5.7 K/UL (ref 4–11)
WBC # BLD AUTO: 6.5 K/UL (ref 4–11)
WBC # BLD AUTO: 6.7 K/UL (ref 4–11)
WBC # BLD AUTO: 7 K/UL (ref 4–11)
WBC # BLD AUTO: 7.2 K/UL (ref 4–11)
WBC # BLD AUTO: 7.4 K/UL (ref 4–11)
WBC # BLD AUTO: 7.7 K/UL (ref 4–11)
WBC # BLD AUTO: 8.1 K/UL (ref 4–11)
WBC # BLD AUTO: 8.7 K/UL (ref 4–11)
WBC #/AREA URNS AUTO: 3892 /HPF
WBC #/AREA URNS AUTO: 88 /HPF

## 2018-01-01 PROCEDURE — 99232 SBSQ HOSP IP/OBS MODERATE 35: CPT | Performed by: INTERNAL MEDICINE

## 2018-01-01 PROCEDURE — 99233 SBSQ HOSP IP/OBS HIGH 50: CPT | Performed by: INTERNAL MEDICINE

## 2018-01-01 PROCEDURE — 71260 CT THORAX DX C+: CPT | Performed by: EMERGENCY MEDICINE

## 2018-01-01 PROCEDURE — 71045 X-RAY EXAM CHEST 1 VIEW: CPT | Performed by: FAMILY MEDICINE

## 2018-01-01 PROCEDURE — 71045 X-RAY EXAM CHEST 1 VIEW: CPT | Performed by: INTERNAL MEDICINE

## 2018-01-01 PROCEDURE — 71045 X-RAY EXAM CHEST 1 VIEW: CPT | Performed by: EMERGENCY MEDICINE

## 2018-01-01 PROCEDURE — 99291 CRITICAL CARE FIRST HOUR: CPT | Performed by: INTERNAL MEDICINE

## 2018-01-01 PROCEDURE — 99222 1ST HOSP IP/OBS MODERATE 55: CPT | Performed by: INTERNAL MEDICINE

## 2018-01-01 PROCEDURE — 51703 INSERT BLADDER CATH COMPLEX: CPT | Performed by: UROLOGY

## 2018-01-01 PROCEDURE — 02HV33Z INSERTION OF INFUSION DEVICE INTO SUPERIOR VENA CAVA, PERCUTANEOUS APPROACH: ICD-10-PCS | Performed by: INTERNAL MEDICINE

## 2018-01-01 PROCEDURE — 99309 SBSQ NF CARE MODERATE MDM 30: CPT | Performed by: NURSE PRACTITIONER

## 2018-01-01 PROCEDURE — 02HV33Z INSERTION OF INFUSION DEVICE INTO SUPERIOR VENA CAVA, PERCUTANEOUS APPROACH: ICD-10-PCS | Performed by: FAMILY MEDICINE

## 2018-01-01 PROCEDURE — B5181ZA FLUOROSCOPY OF SUPERIOR VENA CAVA USING LOW OSMOLAR CONTRAST, GUIDANCE: ICD-10-PCS | Performed by: FAMILY MEDICINE

## 2018-01-01 PROCEDURE — 74230 X-RAY XM SWLNG FUNCJ C+: CPT | Performed by: FAMILY MEDICINE

## 2018-01-01 PROCEDURE — 99223 1ST HOSP IP/OBS HIGH 75: CPT | Performed by: UROLOGY

## 2018-01-01 PROCEDURE — 93970 EXTREMITY STUDY: CPT | Performed by: INTERNAL MEDICINE

## 2018-01-01 PROCEDURE — 93306 TTE W/DOPPLER COMPLETE: CPT | Performed by: HOSPITALIST

## 2018-01-01 PROCEDURE — 99310 SBSQ NF CARE HIGH MDM 45: CPT | Performed by: CLINICAL NURSE SPECIALIST

## 2018-01-01 RX ORDER — 0.9 % SODIUM CHLORIDE 0.9 %
VIAL (ML) INJECTION
Status: COMPLETED
Start: 2018-01-01 | End: 2018-01-01

## 2018-01-01 RX ORDER — HEPARIN SODIUM AND DEXTROSE 10000; 5 [USP'U]/100ML; G/100ML
18 INJECTION INTRAVENOUS ONCE
Status: DISCONTINUED | OUTPATIENT
Start: 2018-01-01 | End: 2018-01-01

## 2018-01-01 RX ORDER — ALBUTEROL SULFATE 2.5 MG/3ML
2.5 SOLUTION RESPIRATORY (INHALATION)
Status: DISCONTINUED | OUTPATIENT
Start: 2018-01-01 | End: 2018-01-01

## 2018-01-01 RX ORDER — DOCUSATE SODIUM 100 MG/1
100 CAPSULE, LIQUID FILLED ORAL 2 TIMES DAILY
Status: DISCONTINUED | OUTPATIENT
Start: 2018-01-01 | End: 2018-01-01

## 2018-01-01 RX ORDER — FINASTERIDE 5 MG/1
5 TABLET, FILM COATED ORAL DAILY
Status: DISCONTINUED | OUTPATIENT
Start: 2018-01-01 | End: 2018-01-01

## 2018-01-01 RX ORDER — FUROSEMIDE 20 MG/1
20 TABLET ORAL
Status: DISCONTINUED | OUTPATIENT
Start: 2018-01-01 | End: 2018-01-01

## 2018-01-01 RX ORDER — TRAMADOL HYDROCHLORIDE 50 MG/1
50 TABLET ORAL EVERY 12 HOURS PRN
Status: DISCONTINUED | OUTPATIENT
Start: 2018-01-01 | End: 2018-01-01

## 2018-01-01 RX ORDER — LIDOCAINE HYDROCHLORIDE 20 MG/ML
20 JELLY TOPICAL AS NEEDED
Status: DISCONTINUED | OUTPATIENT
Start: 2018-01-01 | End: 2018-01-01

## 2018-01-01 RX ORDER — FUROSEMIDE 40 MG/1
40 TABLET ORAL
Status: DISCONTINUED | OUTPATIENT
Start: 2018-01-01 | End: 2018-01-01

## 2018-01-01 RX ORDER — HEPARIN SODIUM AND DEXTROSE 10000; 5 [USP'U]/100ML; G/100ML
INJECTION INTRAVENOUS CONTINUOUS
Status: DISCONTINUED | OUTPATIENT
Start: 2018-01-01 | End: 2018-01-01

## 2018-01-01 RX ORDER — SODIUM CHLORIDE 0.9 % (FLUSH) 0.9 %
10 SYRINGE (ML) INJECTION AS NEEDED
Status: DISCONTINUED | OUTPATIENT
Start: 2018-01-01 | End: 2018-01-01

## 2018-01-01 RX ORDER — PREGABALIN 50 MG/1
50 CAPSULE ORAL 2 TIMES DAILY
Status: DISCONTINUED | OUTPATIENT
Start: 2018-01-01 | End: 2018-01-01

## 2018-01-01 RX ORDER — HEPARIN SODIUM 1000 [USP'U]/ML
80 INJECTION, SOLUTION INTRAVENOUS; SUBCUTANEOUS ONCE
Status: COMPLETED | OUTPATIENT
Start: 2018-01-01 | End: 2018-01-01

## 2018-01-01 RX ORDER — SODIUM CHLORIDE 0.9 % (FLUSH) 0.9 %
3 SYRINGE (ML) INJECTION AS NEEDED
Status: DISCONTINUED | OUTPATIENT
Start: 2018-01-01 | End: 2018-01-01

## 2018-01-01 RX ORDER — HEPARIN SODIUM AND DEXTROSE 10000; 5 [USP'U]/100ML; G/100ML
18 INJECTION INTRAVENOUS ONCE
Status: COMPLETED | OUTPATIENT
Start: 2018-01-01 | End: 2018-01-01

## 2018-01-01 RX ORDER — CARBIDOPA AND LEVODOPA 25; 100 MG/1; MG/1
4 TABLET, ORALLY DISINTEGRATING ORAL 3 TIMES DAILY
Status: DISCONTINUED | OUTPATIENT
Start: 2018-01-01 | End: 2018-01-01

## 2018-01-01 RX ORDER — METOPROLOL SUCCINATE 25 MG/1
25 TABLET, EXTENDED RELEASE ORAL DAILY
COMMUNITY

## 2018-01-01 RX ORDER — SODIUM CHLORIDE 9 MG/ML
INJECTION, SOLUTION INTRAVENOUS
Status: COMPLETED
Start: 2018-01-01 | End: 2018-01-01

## 2018-01-01 RX ORDER — LISINOPRIL 2.5 MG/1
2.5 TABLET ORAL DAILY
Status: DISCONTINUED | OUTPATIENT
Start: 2018-01-01 | End: 2018-01-01

## 2018-01-01 RX ORDER — POLYETHYLENE GLYCOL 3350 17 G/17G
17 POWDER, FOR SOLUTION ORAL DAILY
Status: DISCONTINUED | OUTPATIENT
Start: 2018-01-01 | End: 2018-01-01

## 2018-01-01 RX ORDER — DEXTROSE AND SODIUM CHLORIDE 5; .45 G/100ML; G/100ML
INJECTION, SOLUTION INTRAVENOUS CONTINUOUS
Status: DISCONTINUED | OUTPATIENT
Start: 2018-01-01 | End: 2018-01-01

## 2018-01-01 RX ORDER — ALBUTEROL SULFATE 90 UG/1
1 AEROSOL, METERED RESPIRATORY (INHALATION) EVERY 4 HOURS PRN
Status: DISCONTINUED | OUTPATIENT
Start: 2018-01-01 | End: 2018-01-01

## 2018-01-01 RX ORDER — FUROSEMIDE 10 MG/ML
20 INJECTION INTRAMUSCULAR; INTRAVENOUS
Status: DISCONTINUED | OUTPATIENT
Start: 2018-01-01 | End: 2018-01-01

## 2018-01-01 RX ORDER — AMIODARONE HYDROCHLORIDE 200 MG/1
200 TABLET ORAL DAILY
Status: DISCONTINUED | OUTPATIENT
Start: 2018-01-01 | End: 2018-01-01

## 2018-01-01 RX ORDER — MORPHINE SULFATE 20 MG/ML
5 SOLUTION ORAL
Status: DISCONTINUED | OUTPATIENT
Start: 2018-01-01 | End: 2018-01-01

## 2018-01-01 RX ORDER — CARBIDOPA AND LEVODOPA 25; 100 MG/1; MG/1
1 TABLET, ORALLY DISINTEGRATING ORAL 3 TIMES DAILY
Status: DISCONTINUED | OUTPATIENT
Start: 2018-01-01 | End: 2018-01-01

## 2018-01-01 RX ORDER — METOPROLOL SUCCINATE 50 MG/1
50 TABLET, EXTENDED RELEASE ORAL DAILY
Status: DISCONTINUED | OUTPATIENT
Start: 2018-01-01 | End: 2018-01-01

## 2018-01-01 RX ORDER — OXYBUTYNIN CHLORIDE 5 MG/1
5 TABLET ORAL 2 TIMES DAILY
Status: DISCONTINUED | OUTPATIENT
Start: 2018-01-01 | End: 2018-01-01

## 2018-01-01 RX ORDER — DOXEPIN HYDROCHLORIDE 50 MG/1
1 CAPSULE ORAL DAILY
Status: DISCONTINUED | OUTPATIENT
Start: 2018-01-01 | End: 2018-01-01

## 2018-01-01 RX ORDER — ASPIRIN 81 MG/1
81 TABLET ORAL DAILY
Status: DISCONTINUED | OUTPATIENT
Start: 2018-01-01 | End: 2018-01-01

## 2018-01-01 RX ORDER — SODIUM CHLORIDE 9 MG/ML
INJECTION, SOLUTION INTRAVENOUS
Status: DISPENSED
Start: 2018-01-01 | End: 2018-01-01

## 2018-01-01 RX ORDER — FUROSEMIDE 40 MG/1
40 TABLET ORAL DAILY
Qty: 30 TABLET | Refills: 0 | Status: SHIPPED | OUTPATIENT
Start: 2018-01-01

## 2018-01-01 RX ORDER — POTASSIUM CHLORIDE 20 MEQ/1
20 TABLET, EXTENDED RELEASE ORAL DAILY
Status: DISCONTINUED | OUTPATIENT
Start: 2018-01-01 | End: 2018-01-01

## 2018-01-01 RX ORDER — METOPROLOL SUCCINATE 50 MG/1
50 TABLET, EXTENDED RELEASE ORAL DAILY
COMMUNITY

## 2018-01-01 RX ORDER — SODIUM CHLORIDE 9 MG/ML
125 INJECTION, SOLUTION INTRAVENOUS CONTINUOUS
Status: DISCONTINUED | OUTPATIENT
Start: 2018-01-01 | End: 2018-01-01

## 2018-01-01 RX ORDER — FUROSEMIDE 10 MG/ML
40 INJECTION INTRAMUSCULAR; INTRAVENOUS
Status: DISCONTINUED | OUTPATIENT
Start: 2018-01-01 | End: 2018-01-01

## 2018-01-01 RX ORDER — HEPARIN SODIUM 1000 [USP'U]/ML
80 INJECTION, SOLUTION INTRAVENOUS; SUBCUTANEOUS ONCE
Status: DISCONTINUED | OUTPATIENT
Start: 2018-01-01 | End: 2018-01-01

## 2018-01-01 RX ORDER — AMOXICILLIN AND CLAVULANATE POTASSIUM 875; 125 MG/1; MG/1
1 TABLET, FILM COATED ORAL EVERY 12 HOURS SCHEDULED
Status: DISCONTINUED | OUTPATIENT
Start: 2018-01-01 | End: 2018-01-01

## 2018-01-01 RX ORDER — ARIPIPRAZOLE 15 MG/1
10 TABLET ORAL DAILY
Status: DISCONTINUED | OUTPATIENT
Start: 2018-01-01 | End: 2018-01-01

## 2018-01-01 RX ORDER — OXYBUTYNIN CHLORIDE 5 MG/1
5 TABLET ORAL 2 TIMES DAILY
Qty: 60 TABLET | Refills: 0 | Status: SHIPPED | OUTPATIENT
Start: 2018-01-01

## 2018-01-01 RX ORDER — PANTOPRAZOLE SODIUM 20 MG/1
20 TABLET, DELAYED RELEASE ORAL
Status: DISCONTINUED | OUTPATIENT
Start: 2018-01-01 | End: 2018-01-01

## 2018-01-01 RX ORDER — ALBUTEROL SULFATE 2.5 MG/3ML
2.5 SOLUTION RESPIRATORY (INHALATION) EVERY 6 HOURS PRN
Status: DISCONTINUED | OUTPATIENT
Start: 2018-01-01 | End: 2018-01-01

## 2018-01-01 RX ORDER — ACETAMINOPHEN 650 MG/1
650 SUPPOSITORY RECTAL EVERY 6 HOURS PRN
Status: DISCONTINUED | OUTPATIENT
Start: 2018-01-01 | End: 2018-01-01

## 2018-01-01 RX ORDER — OSELTAMIVIR PHOSPHATE 75 MG/1
75 CAPSULE ORAL 2 TIMES DAILY
Status: DISCONTINUED | OUTPATIENT
Start: 2018-01-01 | End: 2018-01-01

## 2018-01-01 RX ORDER — ARIPIPRAZOLE 15 MG/1
20 TABLET ORAL DAILY
Status: DISCONTINUED | OUTPATIENT
Start: 2018-01-01 | End: 2018-01-01

## 2018-01-01 RX ORDER — ALBUTEROL SULFATE 2.5 MG/3ML
2.5 SOLUTION RESPIRATORY (INHALATION) ONCE
Status: DISCONTINUED | OUTPATIENT
Start: 2018-01-01 | End: 2018-01-01

## 2018-01-01 RX ORDER — AMOXICILLIN AND CLAVULANATE POTASSIUM 875; 125 MG/1; MG/1
1 TABLET, FILM COATED ORAL EVERY 12 HOURS SCHEDULED
Qty: 8 TABLET | Refills: 0 | Status: SHIPPED | OUTPATIENT
Start: 2018-01-01 | End: 2018-01-01

## 2018-01-01 RX ORDER — LIDOCAINE HYDROCHLORIDE 10 MG/ML
0.5 INJECTION, SOLUTION INFILTRATION; PERINEURAL ONCE AS NEEDED
Status: ACTIVE | OUTPATIENT
Start: 2018-01-01 | End: 2018-01-01

## 2018-01-01 RX ORDER — SODIUM CHLORIDE 9 MG/ML
INJECTION, SOLUTION INTRAVENOUS CONTINUOUS
Status: DISCONTINUED | OUTPATIENT
Start: 2018-01-01 | End: 2018-01-01

## 2018-01-01 RX ORDER — WARFARIN SODIUM 5 MG/1
2.5 TABLET ORAL NIGHTLY
Status: DISCONTINUED | OUTPATIENT
Start: 2018-01-01 | End: 2018-01-01

## 2018-01-01 RX ORDER — MORPHINE SULFATE 20 MG/ML
2.6 SOLUTION ORAL
Status: DISCONTINUED | OUTPATIENT
Start: 2018-01-01 | End: 2018-01-01

## 2018-01-01 RX ORDER — ASPIRIN 81 MG/1
81 TABLET, CHEWABLE ORAL DAILY
Status: DISCONTINUED | OUTPATIENT
Start: 2018-01-01 | End: 2018-01-01

## 2018-01-01 RX ORDER — CARBIDOPA AND LEVODOPA 25; 100 MG/1; MG/1
3.5 TABLET, ORALLY DISINTEGRATING ORAL 3 TIMES DAILY
Status: DISCONTINUED | OUTPATIENT
Start: 2018-01-01 | End: 2018-01-01

## 2018-01-12 PROBLEM — R09.02 HYPOXIA: Status: ACTIVE | Noted: 2018-01-01

## 2018-01-12 PROBLEM — J69.0 ASPIRATION PNEUMONIA OF LOWER LOBE, UNSPECIFIED ASPIRATION PNEUMONIA TYPE, UNSPECIFIED LATERALITY (HCC): Status: ACTIVE | Noted: 2018-01-01

## 2018-01-12 PROBLEM — I26.99 OTHER PULMONARY EMBOLISM WITHOUT ACUTE COR PULMONALE, UNSPECIFIED CHRONICITY (HCC): Status: ACTIVE | Noted: 2018-01-01

## 2018-01-12 PROBLEM — I50.21 ACUTE SYSTOLIC CONGESTIVE HEART FAILURE (HCC): Status: ACTIVE | Noted: 2018-01-01

## 2018-01-12 PROBLEM — R06.03 RESPIRATORY DISTRESS: Status: ACTIVE | Noted: 2018-01-01

## 2018-01-12 PROBLEM — I95.89 OTHER SPECIFIED HYPOTENSION: Status: ACTIVE | Noted: 2018-01-01

## 2018-01-12 NOTE — ED PROVIDER NOTES
Patient Seen in: [facility] AND [facility] Emergency Department    History   Patient presents with:  Dyspnea ANURAG SOB (respiratory)    Stated Complaint: hypotension, hypoxia    HPI    Patient is a 80-year-old male from nursing home who presents with hypoxia and h 34.97 kg/m²         Physical Exam    GENERAL: moderate distress, awake and alert  HEENT: MMM, EOMI, PERRL  Neck: supple, non tender.  No jvd  CV: RRR, no murmurs  Resp: +coarse rhonchi b/l with retractions and mild tachypnea  Ab: soft, nontender, no distens Lymphocyte Absolute 0.3 (*)     All other components within normal limits   LACTIC ACID, PLASMA - Normal   ED/MRSA SCREEN BY PCR-CC - Normal   CBC WITH DIFFERENTIAL WITH PLATELET    Narrative:      The following orders were created for panel order CBC WITH upper lobe. Abrupt termination of the left lower lobe bronchus with left lower lobe atelectasis and volume loss within the left hemithorax. This may be secondary to mucous plugging.  However underlying endobronchial lesion should be excluded via bronchosco (primary encounter diagnosis)  Aspiration pneumonia of lower lobe, unspecified aspiration pneumonia type, unspecified laterality ([facility] [address]  Acute systolic congestive heart failure ([address]  Other specified hypotension  Hypoxia  Other pulmonary embolism without ac

## 2018-01-12 NOTE — ED INITIAL ASSESSMENT (HPI)
Patient presents to ER via medics from [facility] after being found to be hypoxic and hypotensive today. Patient typically wears 2L at the NH and his O2 sat was in the upper 80s. Hx CHF.

## 2018-01-12 NOTE — ED NOTES
MD aware of blood pressure; watch BP for now. May need to start on vasopressors. Unable to give fluids due to patient's hx of CHF.

## 2018-01-12 NOTE — CONSULTS
Pulmonary/Critical Care Consult Note    HPI:   Luis Covarrubias is a 80year old male with Patient presents with:  Dyspnea ANURAG SOB (respiratory)    Shaquille Gallo MD      Pt is a 81 yo with parkinson's, dysphagia, wheelchair bound and dependent for all tra daily. Disp:  Rfl:    Albuterol Sulfate  (90 BASE) MCG/ACT Inhalation Aero Soln Inhale 1 puff into the lungs every 4 (four) hours as needed for Wheezing. Disp:  Rfl:    Amiodarone HCl 100 MG Oral Tab Take 200 mg by mouth daily.    Disp:  Rfl:    aspi dextrose 5 % 100 mL ADD-vantage 4.5 g Intravenous Once           Allergies:  No Known Allergies    Social History:  Social History    Marital status: Single              Spouse name:                       Years of education:                 Number of child hold for now   CXR in AM\   Speech eval   Npo until less resp distress    CHF  +HFpEF  Mild inc BNP  Plan follow I/O   Richard   Consider lasix       Dysphagia  Plan speech eval    Parkinson's  Non ambulatory   Denies dementia  Plan cont current Rx    DVT px

## 2018-01-13 NOTE — H&P
[facility]    PATIENT'S NAME: Diana Lopez   ATTENDING PHYSICIAN: Monika Olmstead MD   PATIENT ACCOUNT#:   [id number]    LOCATION:  [place] [address]  MEDICAL RECORD #:   [id number]       YOB: 1926  ADMISSION DATE:       0 Positive cough off and on, mostly when he eats. No dysuria. Positive weakness, positive drooling, and a chronic Richard. PHYSICAL EXAMINATION:    VITAL SIGNS:  Blood pressure 87/45, pulse 79, respirations 29, temperature 98.5 degrees Fahrenheit.   GENE

## 2018-01-13 NOTE — PROGRESS NOTES
[facility] Dosing Service  Antibiotic Dosing    Zoe Austin is a 80year old male for whom pharmacy is dosing Zosyn for treatment of  Pneumonia. .     Allergies: has No Known Allergies.     Vitals: /62   Pulse 68   Temp 98.5 °F (36.9 °C) (Oral)

## 2018-01-13 NOTE — PROGRESS NOTES
[facility]    Progress Note    Raulito Bardales Patient Status:  Inpatient    1926 MRN [id number]   Location [facility] 2W/SW Attending Wolfgang Hernandez MD   Hosp Day # 1 PCP Syeda Daniel MD     Subjective:     Unable to p 114 (H) 01/12/2018   CA 9.7 01/12/2018   ALB 3.5 09/29/2016   BILT 0.6 09/29/2016   TP 6.5 09/29/2016   AST 12 (L) 09/29/2016   ALT <5 (L) 09/29/2016   PTT >240.0 () 01/13/2018   DDIMER 3.01 (H) 01/12/2018   MG 2.3 11/15/2016   TROP 0.05 () 01/12/2018 Report  Interpretation  -------------------------- Sinus Rhythm -First degree A-V block  -Poor R-wave progression -nonspecific -consider old anterior infarct.  - Nonspecific T-abnormality.  ABNORMAL When compared with ECG of 11/15/2016 14:25:29 LVH voltage

## 2018-01-13 NOTE — CM/SW NOTE
10:38am Update- DANILO confirmed the pt is a longterm resident of [address] [place] [address] under Medicaid benefits. DANILO placed call to the son Elda Guillermo [phone] to offer assistance and to confirm dc back to snf when stable. Updated clinicals sent to snf via allscripts.

## 2018-01-13 NOTE — PROGRESS NOTES
Cardiology progress note  1/13/2018    Objective finding BP low on phenylephrine   Subjective finding SOB      Current Facility-Administered Medications:  phenylephrine HCl (JD-SYNEPHRINE) 50 mg in sodium chloride 0.9 % 250 mL infusion 100-200 mcg/min Int 2463    docusate sodium (COLACE) cap 100 mg 100 mg Oral BID Faustino Meyers MD     multivitamin (ADULT) tab 1 tablet 1 tablet Oral Daily Faustino Meyers MD 1 tablet at 01/13/18 0924    Pantoprazole Sodium (PROTONIX) EC tab 20 mg 20 mg Oral [facility] RENATO Ayala (afs=[id number])    Result Date: 1/13/2018  CONCLUSION:  1. No evidence of DVT in the common femoral, femoral or popliteal veins bilaterally. 2. DVT in the left posterior tibial veins.  Limited study and nonvisualization of right calf veins and left peroneal vei and unchanged since 4/21/16 . Severe cardiomegaly. Indeterminant right thyroid nodule, if clinically indicated this can be further worked up with a nonemergent thyroid ultrasound.   This report was communicated by telephone to Dr. Dalila Roberts on 1/12

## 2018-01-13 NOTE — PROGRESS NOTES
[facility]    Progress Note    Zoe Austin Patient Status:  Inpatient    1926 MRN [id number]   Location [facility] 2W/SW Attending Melissa Vasquez MD   Hosp Day # 1 PCP Rodri Mckeon MD       Subjective:   Angle Cao Bilat - Diag [facility] (lqd=[id number])    Result Date: 1/13/2018  CONCLUSION:  1. No evidence of DVT in the common femoral, femoral or popliteal veins bilaterally. 2. DVT in the left posterior tibial veins.  Limited study and nonvisualization of right calf veins and postoperative and unchanged since 4/21/16 . Severe cardiomegaly. Indeterminant right thyroid nodule, if clinically indicated this can be further worked up with a nonemergent thyroid ultrasound.   This report was communicated by telephone to Dr. Cinthia Logan

## 2018-01-13 NOTE — CONSULTS
[facility] HOSP - [facility]    Report of Consultation    Chaim Pierce Patient Status:  Inpatient    1926 MRN [id number]   Location [facility] 2W/SW Attending Carlos Bravo MD   Hosp Day # 0 PCP Joceline Sanchez MD     Date of Admission: Continuous   Piperacillin Sod-Tazobactam So (ZOSYN) 4.5 g in dextrose 5 % 100 mL ADD-vantage 4.5 g Intravenous Q8H       Prescriptions Prior to Admission:  docusate sodium 100 MG Oral Cap Take 100 mg by mouth 2 (two) times daily.    multivitamin Oral Tab Ta (six) hours as needed for Pain. Acetaminophen-Codeine #3 300-30 MG Oral Tab Take 1 tablet by mouth every 4 (four) hours as needed for Pain. HYDROcodone-acetaminophen 5-325 MG Oral Tab Take 1 tablet by mouth every 6 (six) hours as needed for Pain.    ART (H) 01/12/2018   CA 9.7 01/12/2018   ALB 3.5 09/29/2016   TP 6.5 09/29/2016   AST 12 (L) 09/29/2016   ALT <5 (L) 09/29/2016   PTT 31.4 01/12/2018   DDIMER 3.01 (H) 01/12/2018   MG 2.3 11/15/2016   TROP 0.04 (HH) 01/12/2018         Imaging:  Xr Chest Ap Por 1) Respiratory distress: likely multifactorial due to PE and PNA and CHF, mild trop leak due to PE likely, we will order 3 sets of Ce, on heparin drip      2) Aspiration pneumonia of lower lobe, unspecified aspiration pneumonia type, unspecified latera

## 2018-01-14 NOTE — PROGRESS NOTES
[facility]    Progress Note    Denton Kraus Patient Status:  Inpatient    1926 MRN [id number]   Location [facility] 2W/SW Attending Pete Daniel MD   Hosp Day # 2 PCP Yadiar Ramires MD       Subjective:   Mumtaz Cole >240.0 (HH) 01/14/2018   INR 1.2 01/13/2018   DDIMER 3.01 (H) 01/12/2018   MG 2.3 11/15/2016   TROP 0.05 (HH) 01/13/2018       No procedure found. Us Venous Doppler Leg Bilat - Diag Img (cpt=93970)    Result Date: 1/13/2018  CONCLUSION:  1.  No evidence branch of the left upper lobe. Abrupt termination of the left lower lobe bronchus with left lower lobe atelectasis and volume loss within the left hemithorax. This may be secondary to mucous plugging.  However underlying endobronchial lesion should be excl

## 2018-01-14 NOTE — PROGRESS NOTES
[facility]    Progress Note    Jyothi Gutiérrez Patient Status:  Inpatient    1926 MRN [id number]   Location [facility] 2W/SW Attending Arpan Brock MD   Hosp Day # 2 PCP Harleen Mitchell MD     Subjective:     Unable to p ALT <5 (L) 09/29/2016   PTT >240.0 (HH) 01/14/2018   INR 1.2 01/13/2018   DDIMER 3.01 (H) 01/12/2018   MG 2.3 11/15/2016   TROP 0.05 (HH) 01/13/2018       Us Venous Doppler Leg Bilat - Diag Img (cpt=93970)    Result Date: 1/13/2018  CONCLUSION:  1.  No ev lobar branch of the left upper lobe. Abrupt termination of the left lower lobe bronchus with left lower lobe atelectasis and volume loss within the left hemithorax. This may be secondary to mucous plugging.  However underlying endobronchial lesion should b

## 2018-01-14 NOTE — SLP NOTE
Re-ADULT SWALLOWING EVALUATION    ASSESSMENT    ASSESSMENT/OVERALL IMPRESSION:    Pt seen for Re-BSE at bedside with his son present. Collaborated with RN whom reports attempting to give pt his medications crushed in applesauce.   Pt did not produce a swal thick liquids with a throat clear. No overt clinical signs of aspiration with solids or nectar thick liquids by teaspoon. IMPRESSIONS:    Pt presents with mild to moderate oral dysphagia and probable pharyngeal dysfunction/aspiration risk.   Mary Garcia • Cellulitis    • CKD (chronic kidney disease)    • Congestive heart disease (HCC)    • Diverticulosis of large intestine    • Essential hypertension    • GI bleed    • Parkinson disease (HCC)    • Paroxysmal atrial fibrillation (HCC)    • Systolic heart puree during oral hold the pt demonstrated a throat clear, wet vocal quality, and delayed cough. On nectar thick liquids by open cup the pt demonstrated a throat clear. (Please note: Silent aspiration cannot be evaluated clinically.  Videofluoroscopic Swa

## 2018-01-14 NOTE — PHYSICAL THERAPY NOTE
PHYSICAL THERAPY EVALUATION - INPATIENT     Room Number: 222/222-A  Evaluation Date: 1/14/2018  Type of Evaluation;  Initial   Physician Order: PT Eval and Treat    Presenting Problem: hypoxemia, hypotensive, acute respiratory failure, pneumonia, flu is positioning, pressure relief, AA/A/PROM and if possible to progress back to [facility] [place] [place] by [facility] lift up to bedside chair (recliner needed, could not sit in standard CCU chair). If limited progress then [facility] may be more appropriate.  Per son, plan is for patient to nursing facility [facility].  Gets [facility] visits. )   Home Layout: Able to live on main level                      Patient Owned Equipment:  (Staff uses Isidore Shepherd up to w/c twice daily. )       Prior Level of [place]: Up to w/c twice daily via Isidore Shepherd l NEUROLOGICAL FINDINGS  Neurological Findings:  (Very limited movement in legs, contractures R>L BLEs. )                   ACTIVITY TOLERANCE  4L. HR 84. 02 88%. Poor endurance for activity.      AM-PAC '6-Clicks' INPATIENT S and hands x 10 reps each. Patient to active participate in BLE AA/PROM bilateral hip, knee and L ankle x 10 and PROM R ankle x 10. Care to manage impaired skin integrity.     Goal #2  Current Status    Goal #3 PT to assist RN with optimal positioning strate

## 2018-01-14 NOTE — PROGRESS NOTES
Cardiology progress note  1/14/2018    Objective finding BP low off phenylephrine   Subjective finding better feeling    Current Facility-Administered Medications:  Warfarin Sodium (COUMADIN) tab 2.5 mg 2.5 mg Oral Nightly MD Willard Horner pregabalin (LYRICA) cap 50 mg 50 mg Oral BID Faustino Meyers MD 50 mg at 01/14/18 0854    psyllium (METAMUCIL SMOOTH TEXTURE) 28 % packet 1 packet 1 packet Oral BID Faustino Meyers MD 1 packet at 01/14/18 0854            Intake/Output Summary (Last 24 nonvisualization of right calf veins and left peroneal veins. Xr Chest Ap Portable  (cpt=71045)    Result Date: 1/14/2018  CONCLUSION:  1.  Left PICC line has been intervally retracted with tip now projecting at the confluence of the brachiocephalic wabe progression    Impression/ Recommendation:       1) Respiratory distress: likely multifactorial due to PE and PNA and CHF, mild trop leak due to PE likely, on heparin drip improved off mask      2) Aspiration pneumonia of lower lobe, unspecified aspir

## 2018-01-14 NOTE — SLP NOTE
ADULT SWALLOWING EVALUATION    ASSESSMENT    ASSESSMENT/OVERALL IMPRESSION:    Per son report, Pt was on solid/thin liquids prior to admission with recommendation for chin-tuck; however, Pt unable to achieve this posture d/t limited neck position.  Son stat Non-oral  Treatment Plan/Recommendations: SLP to reassess  Discharge Recommendations/Plan: Undetermined    HISTORY   MEDICAL HISTORY  Reason for Referral: R/O aspiration    Problem List  Principal Problem:    Respiratory distress  Active Problems:    Aspir Limits  Strength:  (overall reduced)  Tone:  (overall reduced)  Range of Motion:  (overall reduced)  Rate of Motion: Reduced    Voice Quality: Weak  Respiratory Status: Supplemental O2;Nasal cannula  Consistencies Trialed: Thin liquids; Nectar thick liquids

## 2018-01-15 NOTE — PROGRESS NOTES
[facility] Pharmacy Note:  Renal Adjustment for piperacillin/tazobactam (Ryley Cai)    Darien Alexander is a 80year old male who has been prescribed piperacillin/tazobactam (ZOSYN) 4.5g every 8 hrs.   CrCl is estimated creatinine clearance is 37.2 mL/min (based on SCr

## 2018-01-15 NOTE — PHYSICAL THERAPY NOTE
PHYSICAL THERAPY TREATMENT NOTE - INPATIENT    Room Number: 222/222-A       Presenting Problem: hypoxemia, hypotensive, acute respiratory failure, pneumonia, flu isolation, Parkinsons    Problem List  Principal Problem:    Respiratory distress  Active Pro Static Sitting: Not tested  Dynamic Sitting: Not tested           Static Standing: Not tested  Dynamic Standing: Not tested    ACTIVITY TOLERANCE  Liters of O2:  4    AM-PAC '6-Clicks' INPATIENT SHORT FORM - BASIC MOBILITY promote improved circulation and alignment. Goal #3   Current Status  in progress   Goal #4 PT to collaborate with staff on upright positioning if patient is to use overhead lift bed>chair.  Will need recliner if up to chair as he does not have the stren

## 2018-01-15 NOTE — PLAN OF CARE
Report given to [facility], transferring to [phone]. Plan of care, orders, assessment reviewed. No further questions. Jerad Bales at bedside for transfer.

## 2018-01-15 NOTE — PROGRESS NOTES
Cardiology progress note  1/15/2018    Objective finding BP NR  Subjective finding better feeling      Current Facility-Administered Medications:  Potassium Chloride ER (K-DUR M20) CR tab 20 mEq 20 mEq Oral Daily Faustino Meyers MD 20 mEq at 01/15/18 5445 3350 (MIRALAX) powder packet 17 g 17 g Oral Daily Faustino Meyers MD 17 g at 01/15/18 0957    pregabalin (LYRICA) cap 50 mg 50 mg Oral BID Faustino Meyers MD 50 mg at 01/15/18 0958    psyllium (METAMUCIL SMOOTH TEXTURE) 28 % packet 1 packet 1 packet Oral worsening left basilar predominant airspace disease. Xr Chest Ap Portable  (cpt=71045)    Result Date: 1/14/2018  CONCLUSION:  1. Left PICC line has been intervally retracted with tip now projecting at the confluence of the brachiocephalic veins. 2.  L

## 2018-01-15 NOTE — PROGRESS NOTES
[facility]    Progress Note    Chai Arriaga Patient Status:  Inpatient    1926 MRN [id number]   Location [facility] 2W/SW Attending Flory Arce MD   Hosp Day # 3 PCP Dat Blanco MD       Subjective:   Manolo Sorto 01/15/2018   DDIMER 3.01 (H) 01/12/2018   MG 2.3 11/15/2016   TROP 0.05 (HH) 01/13/2018       No procedure found. Us Venous Doppler Leg Bilat - Diag [facility] (cpt=93970)    Result Date: 1/13/2018  CONCLUSION:  1.  No evidence of DVT in the common femoral, fem -------------------------- Sinus Rhythm -First degree A-V block -Nonspecific QRS widening.  - Diffuse nonspecific T-abnormality.  ABNORMAL When compared with ECG of 01/12/2018 14:36:41 Cannot compare due to poor quality of previous EKG Electronically signed

## 2018-01-15 NOTE — PROGRESS NOTES
[facility]    Progress Note      Assessment and Plan:   1. Venous thromboembolism    Recommendations: Eliquis. Okay to floor transfer. 2.  Influenza    Recommendations: Tamiflu    3.   Dysphagia    Recommendations: Swallowing strategies [facility]  Medical Director, [facility]  Pager: [phone]

## 2018-01-16 NOTE — PROGRESS NOTES
[place] [facility]    Progress Note    Lala Lozoya Patient Status:  Inpatient    1926 MRN [id number]   Location [facility] 4W/SW/SE Attending Urban Bermeo MD   Hosp Day # 4 PCP Meng Pedroza MD       Subjective:   Ning Almazan AST 12 (L) 09/29/2016   ALT <5 (L) 09/29/2016   PTT >240.0 (HH) 01/14/2018   INR 1.2 01/15/2018   DDIMER 3.01 (H) 01/12/2018   MG 2.3 11/15/2016   TROP 0.05 (HH) 01/13/2018       No procedure found.     Xr Chest Ap Portable  (cpt=71045)    Result Date: 1/

## 2018-01-16 NOTE — PROGRESS NOTES
[facility]    Progress Note    Jyothi Gutiérrez Patient Status:  Inpatient    1926 MRN [id number]   Location [facility] 4W/SW/SE Attending Arpan Brock MD   Hosp Day # 4 PCP Harleen Mitchell MD     Subjective:     Unable t (cpt=71045)    Result Date: 1/15/2018  CONCLUSION:   Pulmonary vascular congestion with left pleural effusion and worsening left basilar predominant airspace disease. Millie Tenorio.  Richelle Umaña MD  1/16/2018

## 2018-01-16 NOTE — CM/SW NOTE
CTL rounds: met with pt's son Chidi Eaton at pt's bedside. Pt is BPCI eligible under . [facility] program including 90 day phone call follow up reviewed with son who verbalizes understanding. Medicare letter and Benny Carne given to pt's son.

## 2018-01-16 NOTE — PROGRESS NOTES
Cardiology progress note  1/16/2018    Objective finding BP NR  Subjective finding better feeling      Current Facility-Administered Medications:  furosemide (LASIX) tab 20 mg 20 mg Oral BID (Diuretic) Thang Long MD 20 mg at 01/16/18 1102    Potassium C 9258    Pantoprazole Sodium (PROTONIX) EC tab 20 mg 20 mg Oral QAM AC Faustino Meyers MD 20 mg at 01/16/18 0919    PEG 3350 (MIRALAX) powder packet 17 g 17 g Oral Daily Faustino Meyers MD 17 g at 01/15/18 0957    pregabalin (LYRICA) cap 50 mg 50 mg Oral Portable  (cpt=71045)    Result Date: 1/16/2018  CONCLUSION:  1. Mild cardiomegaly and mild pulmonary congestive changes. Moderate right lung base airspace disease that may suggest developing pneumonia.  Interval decrease in left base consolidation/ left ef

## 2018-01-17 NOTE — SLP NOTE
SPEECH DAILY NOTE - INPATIENT    ASSESSMENT & PLAN   ASSESSMENT  New orders rec'd to \"re-evaluate\" patient's swallow and ability to sustain safe PO nutrition at this time.  Pt known to SLP department with diet recommendations of mechanical soft with necta position; Slow rate;Small bites and sips; No straw  Medication Administration Recommendations: Present with thickened liquid    Patient Experiencing Pain: No    Discharge Recommendations  Discharge Recommendations/Plan: Undetermined    Treatment Plan  Treatm

## 2018-01-17 NOTE — PROGRESS NOTES
[facility]    Progress Note    Raulito Bardales Patient Status:  Inpatient    1926 MRN [id number]   Location [facility] 4W/SW/SE Attending Wolfgang Hernandez MD   Hosp Day # 5 PCP Syeda Daniel MD       Subjective:   Sally Yoon 09/29/2016   BILT 0.6 09/29/2016   TP 6.5 09/29/2016   AST 12 (L) 09/29/2016   ALT <5 (L) 09/29/2016   PTT >240.0 (HH) 01/14/2018   INR 1.2 01/15/2018   DDIMER 3.01 (H) 01/12/2018   MG 2.3 11/15/2016   TROP 0.05 (HH) 01/13/2018       No procedure found.

## 2018-01-17 NOTE — PROGRESS NOTES
[facility]     Progress Note        Amy Landa Patient Status:  Inpatient    1926 MRN [id number]   Location [facility] 4W/SW/SE Attending Max Wolfe MD   Hosp Day # 5 PCP Lorrie Foster MD       Subjective:   Vickie Silver (PROTONIX) EC tab 20 mg 20 mg Oral QAM AC   PEG 3350 (MIRALAX) powder packet 17 g 17 g Oral Daily   pregabalin (LYRICA) cap 50 mg 50 mg Oral BID   psyllium (METAMUCIL SMOOTH TEXTURE) 28 % packet 1 packet 1 packet Oral BID       Continuous Infusions:     Ph

## 2018-01-17 NOTE — PROGRESS NOTES
[facility]  Progress Note    Sravani Jeong Patient Status:  Inpatient    1926 MRN [id number]   Location [facility] 4W/SW/SE Attending Eula Osorio MD   Hosp Day # 5 PCP April Haider MD     Subjective:  Patient is very tired, n Xr Chest Ap Portable  (cpt=71045)     Result Date: 1/15/2018  CONCLUSION:   Pulmonary vascular congestion with left pleural effusion and worsening left basilar predominant airspace disease.       Xr Chest Ap Portable  (cpt=71045)     Result Date: 1/14/20 furosemide (LASIX) tab 20 mg 20 mg Oral BID (Diuretic)   Potassium Chloride ER (K-DUR M20) CR tab 20 mEq 20 mEq Oral Daily   Piperacillin Sod-Tazobactam So (ZOSYN) 3.375 g in dextrose 5 % 100 mL ADD-vantage 3.375 g Intravenous Q8H   apixaban (ELIQUIS) tab

## 2018-01-18 NOTE — PROGRESS NOTES
[facility]    Progress Note    Irving Baird Patient Status:  Inpatient    1926 MRN [id number]   Location [facility] 4W/SW/SE Attending Irving Munoz MD   Hosp Day # 6 PCP Vladimir Braxton MD       Subjective:   Gera Sofia 01/18/2018   BUN 22 (H) 01/18/2018    (H) 01/18/2018   K 4.2 01/18/2018    (H) 01/18/2018   CO2 28 01/18/2018    (H) 01/18/2018   CA 9.4 01/18/2018   ALB 2.5 (L) 01/18/2018   ALKPHO 30 (L) 01/18/2018   BILT 0.6 01/18/2018   TP 5.6 (L) 01

## 2018-01-18 NOTE — PROGRESS NOTES
Cardiology progress note  1/18/2018    Objective finding BP NR  Subjective finding better feeling        Current Facility-Administered Medications:  potassium chloride (K-SOL) 40 meq/30 ml (10%) oral solution 20 mEq 20 mEq Oral Daily Joseph Gonzalez MD mg at 01/16/18 0919    PEG 3350 (MIRALAX) powder packet 17 g 17 g Oral Daily Faustino Meyers MD 17 g at 01/15/18 0957    pregabalin (LYRICA) cap 50 mg 50 mg Oral BID Faustino Meyers MD 50 mg at 01/18/18 1033    psyllium (METAMUCIL SMOOTH TEXTURE) 28 % pa poor R wabe progression    Impression/ Recommendation:       1) Respiratory distress: likely multifactorial due to PE and PNA and CHF improved, mild trop leak due to PE likely, on eliquis      2) Aspiration pneumonia of lower lobe on ABX pulm following, he

## 2018-01-18 NOTE — WOUND PROGRESS NOTE
WOUND CARE NOTE      PLAN   Recommendations:  Dietary consult for recommendations for nutrition to optimize wound healing  Turn schedules  Heels elevated using pillows, heel wedge or heel boots to offload heels  To prevent sliding: decrease head of bed a 01/18/2018   BILT 0.6 01/18/2018   TP 5.6 (L) 01/18/2018   AST 13 (L) 01/18/2018   ALT <5 (L) 01/18/2018   MG 2.3 11/15/2016

## 2018-01-18 NOTE — RESPIRATORY THERAPY NOTE
[facility] CAMILLE ASSESSMENT:    Pt does not have a previous diagnosis of CAMILLE. Pt does not routinely use a CPAP device at home. This pt is suspected to be at high risk for CAMILLE and sleep lab packet was provided to patient for outpatient follow-up.

## 2018-01-18 NOTE — VIDEO SWALLOW STUDY NOTE
ADULT VIDEOFLUOROSCOPIC SWALLOWING STUDY    Admission Date: 1/12/2018  Evaluation Date: 01/18/18  Radiologist: Dr. Dimas Alvarado:    Speech to f/u x3 sessions/meals for dysphagia treatment.  Focus will be to ensure safe tolerance of diet and to of Recent: Pneumonia  Precautions: Aspiration  Imaging results:  CXR 1/16/18    CONCLUSION:   1. Mild cardiomegaly and mild pulmonary congestive changes. Moderate right lung base airspace disease that may suggest developing pneumonia.  Interval decrease in (via cup/controlled straw) nor pureed trials. SILENT aspiration intermittent on thin liquids via tsp. Recommend pureed/nectar-thick liquids via pinched straw. Pt to be fed only when fully alert. Collaborated with RN who will obtain diet orders.  VFSS result

## 2018-01-18 NOTE — PROGRESS NOTES
Pulmonary/Critical Care Follow Up Note    HPI:   Jeaneth Sanders is a 80year old male with Patient presents with:  Dyspnea ANURAG SOB (respiratory)      PCP Mehran Vail MD  Admission Attending Mark Hayward MD    Hospital Day #6    Minimally verbal  Shelly Lim Pantoprazole Sodium (PROTONIX) EC tab 20 mg, 20 mg, Oral, QAM AC  •  PEG 3350 (MIRALAX) powder packet 17 g, 17 g, Oral, Daily  •  pregabalin (LYRICA) cap 50 mg, 50 mg, Oral, BID  •  psyllium (METAMUCIL SMOOTH TEXTURE) 28 % packet 1 packet, 1 packet, Oral,

## 2018-01-19 NOTE — PHYSICAL THERAPY NOTE
OT consulted w/ RN. Pt is a long term care resident at [facility]. Prior to admission, pt was dependent for bed mobility and transfers to wheelchair via [employer]. Pt does not stand and he is non-ambulatory. Pt was dependent for all ADLs.      Pt will be

## 2018-01-19 NOTE — PROGRESS NOTES
[facility]    Progress Note    Andria Crawford Patient Status:  Inpatient    1926 MRN [id number]   Location [facility] 4W/SW/SE Attending Victoria Dow MD   Hosp Day # 7 PCP Guidodoug Sol MD       Subjective:   Deirdre Shin 0.95 01/19/2018   BUN 22 (H) 01/19/2018    (H) 01/19/2018   K 4.0 01/19/2018    (H) 01/19/2018   CO2 29 01/19/2018    (H) 01/19/2018   CA 9.9 01/19/2018   ALB 2.5 (L) 01/18/2018   ALKPHO 30 (L) 01/18/2018   BILT 0.6 01/18/2018   TP 5.6 (

## 2018-01-19 NOTE — PLAN OF CARE
Patient/Family Goals    • Patient/Family Long Term Goal Not Progressing          CARDIOVASCULAR - ADULT    • Maintains optimal cardiac output and hemodynamic stability Progressing    • Absence of cardiac arrhythmias or at baseline Progressing        MUSCUL

## 2018-01-19 NOTE — PROGRESS NOTES
Cardiology progress note  1/19/2018    Objective finding BP stable   Subjective finding better feeling        Current Facility-Administered Medications:  potassium chloride (K-SOL) 40 meq/30 ml (10%) oral solution 20 mEq 20 mEq Oral Daily Faustino Meyers, QAM AC Faustino Meyers MD 20 mg at 01/16/18 0919    PEG 3350 (MIRALAX) powder packet 17 g 17 g Oral Daily Faustino Meyers MD 17 g at 01/15/18 0957    pregabalin (LYRICA) cap 50 mg 50 mg Oral BID Faustino Meyers MD 50 mg at 01/19/18 1042    psyllium (MET demonstrated.             EKJ: NSR, poor R wabe progression    Impression/ Recommendation:       1) Respiratory distress: likely multifactorial due to PE and PNA and CHF improved, mild trop leak due to PE likely, on eliquis      2) Aspiration pneumonia of l

## 2018-01-19 NOTE — OCCUPATIONAL THERAPY NOTE
OT orders received and chart reviewed. Per chart pt is a long term care resident at [facility]. Pt was dependent for bed mobility and adls. Pt is non ambulatory and required lift assist to wc. Pt had been able to assist w/ self feeding.  Discussed approriate

## 2018-01-19 NOTE — PROGRESS NOTES
Pulmonary/Critical Care Follow Up Note    HPI:   Pari Ly is a 80year old male with Patient presents with:  Dyspnea ANURAG SOB (respiratory)      PCP Taylor Jj MD  Admission Attending Gerardo Obrien MD    Hospital Day #7    Did not tolerated NI tablet, 1 tablet, Oral, Daily  •  Pantoprazole Sodium (PROTONIX) EC tab 20 mg, 20 mg, Oral, QAM AC  •  PEG 3350 (MIRALAX) powder packet 17 g, 17 g, Oral, Daily  •  pregabalin (LYRICA) cap 50 mg, 50 mg, Oral, BID  •  psyllium (METAMUCIL SMOOTH TEXTURE) 28 %

## 2018-01-19 NOTE — DIETARY NOTE
ADULT NUTRITION INITIAL ASSESSMENT    Pt is at moderate nutrition risk. Pt does not meet malnutrition criteria.       RECOMMENDATIONS TO MD: see nutrition intervention    NUTRITION DIAGNOSIS/PROBLEM:  Increased nutrient needs of protein  related to increas 0530 99.2 kg (218 lb 9.6 oz)   01/14/18 0600 98.9 kg (218 lb 1.6 oz)   01/12/18 1923 109.8 kg (242 lb)   01/12/18 1428 104.3 kg (230 lb)     GASTROINTESTINAL PROBLEMS: difficulty chewing and dysphagia    FOOD/NUTRITION RELATED HISTORY:  Appetite: Fair  Int

## 2018-01-20 NOTE — PROGRESS NOTES
[facility]    CDI Prediction Tool Protocol (Vancomycin Initiated)    OVP (oral vancomycin prophylaxis) 125 mg PO BID is being started in this patient based on a score of 14.       Score Breakdown:  High risk antibiotic use (5 points)  [facility] le

## 2018-01-20 NOTE — PLAN OF CARE
CARDIOVASCULAR - ADULT    • Maintains optimal cardiac output and hemodynamic stability Progressing    • Absence of cardiac arrhythmias or at baseline  LABS AND V/S MONITORED Progressing        MUSCULOSKELETAL - ADULT    • Return mobility to safest level of

## 2018-01-20 NOTE — RESPIRATORY THERAPY NOTE
Matty Wheeler CAMILLE ASSESSMENT:    Pt does not have a previous diagnosis of CAMILLE. Pt does not routinely use a CPAP device at home. This pt is suspected to be at high risk for CAMILLE and sleep lab packet was provided to patient for outpatient follow-up.

## 2018-01-21 NOTE — PROGRESS NOTES
Patient seen in follow up. Chart reviewed.    01/20/18  1153   BP: 101/36   Pulse: 79   Resp: 30   Temp: 97.9 °F (36.6 °C)       Intake/Output Summary (Last 24 hours) at 01/20/18 1832  Last data filed at 01/20/18 1444   Gross per 24 hour   Intake PEG 3350 (MIRALAX) powder packet 17 g 17 g Oral Daily   pregabalin (LYRICA) cap 50 mg 50 mg Oral BID   psyllium (METAMUCIL SMOOTH TEXTURE) 28 % packet 1 packet 1 packet Oral BID       Prescriptions Prior to Admission:  docusate sodium 100 MG Oral Cap Take Potassium Chloride ER 10 MEQ Oral Tab CR Take 10 mEq by mouth daily. TraMADol HCl 50 MG Oral Tab Take 50 mg by mouth every 6 (six) hours as needed for Pain.    Acetaminophen-Codeine #3 300-30 MG Oral Tab Take 1 tablet by mouth every 4 (four) hours as nee

## 2018-01-21 NOTE — PROGRESS NOTES
Pulmonary/Critical Care Follow Up Note    HPI:   Sally Cao is a 80year old male with Patient presents with:  Dyspnea ANURAG SOB (respiratory)      PCP Ramona Conroy MD  Admission Attending Ángel Moreno 15 Day #8    Not tolerating NIV tab 1 tablet, 1 tablet, Oral, Daily  •  Pantoprazole Sodium (PROTONIX) EC tab 20 mg, 20 mg, Oral, QAM AC  •  PEG 3350 (MIRALAX) powder packet 17 g, 17 g, Oral, Daily  •  pregabalin (LYRICA) cap 50 mg, 50 mg, Oral, BID  •  psyllium (METAMUCIL SMOOTH TEXTURE

## 2018-01-21 NOTE — PROGRESS NOTES
[facility]    Progress Note    Chai Arriaga Patient Status:  Inpatient    1926 MRN [id number]   Location [facility] 4W/SW/SE Attending Flory Arce MD   Hosp Day # 8 PCP Dat Blanco MD       Subjective:   Luna Patrick CREATSERUM 0.95 01/19/2018   BUN 22 (H) 01/19/2018    (H) 01/19/2018   K 4.0 01/19/2018    (H) 01/19/2018   CO2 29 01/19/2018    (H) 01/19/2018   CA 9.9 01/19/2018   ALB 2.5 (L) 01/18/2018   ALKPHO 30 (L) 01/18/2018   BILT 0.6 01/18/20

## 2018-01-21 NOTE — PROGRESS NOTES
Patient seen in follow up. Chart reviewed.    01/21/18  0416   BP: 116/77   Pulse: 72   Resp: 24   Temp: 97.8 °F (36.6 °C)       Intake/Output Summary (Last 24 hours) at 01/21/18 1318  Last data filed at 01/21/18 0946   Gross per 24 hour   Intake pregabalin (LYRICA) cap 50 mg 50 mg Oral BID   psyllium (METAMUCIL SMOOTH TEXTURE) 28 % packet 1 packet 1 packet Oral BID       Prescriptions Prior to Admission:  docusate sodium 100 MG Oral Cap Take 100 mg by mouth 2 (two) times daily.    multivitamin Oral TraMADol HCl 50 MG Oral Tab Take 50 mg by mouth every 6 (six) hours as needed for Pain. Acetaminophen-Codeine #3 300-30 MG Oral Tab Take 1 tablet by mouth every 4 (four) hours as needed for Pain.    HYDROcodone-acetaminophen 5-325 MG Oral Tab Take 1 table

## 2018-01-22 NOTE — PROGRESS NOTES
[facility]    Progress Note    Raulito Bardales Patient Status:  Inpatient    1926 MRN [id number]   Location [facility] 4W/SW/SE Attending Wolfgang Hernandez MD   Hosp Day # 10 PCP Syeda Daniel MD     Subjective:     Unable

## 2018-01-22 NOTE — PROGRESS NOTES
Cardiology progress note  1/22/2018    Objective finding BP stable   Subjective finding better feeling        Current Facility-Administered Medications:  apixaban (ELIQUIS) tab 5 mg 5 mg Oral BID Faustino Meyers MD 5 mg at 01/22/18 0847    vancomycin (FIR tab 20 mg 20 mg Oral QAM AC Faustino Meyers MD 20 mg at 01/22/18 0845    PEG 3350 (MIRALAX) powder packet 17 g 17 g Oral Daily Faustino Meyers MD 17 g at 01/15/18 0957    pregabalin (LYRICA) cap 50 mg 50 mg Oral BID Faustino Meyers MD 50 mg at 01/22/18 PE likely, on eliquis    2) Aspiration pneumonia of lower lobe on ABX pulm following, he also has viral infection positive for influenza      3) Acute diastolic HF with normal EF and normal filling pressures by echo , lasix held creat improvrd, continue la

## 2018-01-22 NOTE — PROGRESS NOTES
[facility]    Progress Note    Sally Davila Patient Status:  Inpatient    1926 MRN [id number]   Location [facility] 4W/SW/SE Attending Makayla Ames MD   Hosp Day # 10 PCP Amelia Garcia MD       Subjective:   Ирина Lees 01/19/2018    01/19/2018   CREATSERUM 0.95 01/19/2018   BUN 22 (H) 01/19/2018    (H) 01/19/2018   K 4.0 01/19/2018    (H) 01/19/2018   CO2 29 01/19/2018    (H) 01/19/2018   CA 9.9 01/19/2018   ALB 2.5 (L) 01/18/2018   ALKPHO 30 (L

## 2018-01-22 NOTE — PROGRESS NOTES
Pulmonary/Critical Care Follow Up Note    HPI:   Luisa Lozano is a 80year old male with Patient presents with:  Dyspnea ANURAG SOB (respiratory)      PCP Latrell Garza MD  Admission Attending Ángel iPno 15 Day #10    Not tolerating NIV (ADULT) tab 1 tablet, 1 tablet, Oral, Daily  •  Pantoprazole Sodium (PROTONIX) EC tab 20 mg, 20 mg, Oral, QAM AC  •  PEG 3350 (MIRALAX) powder packet 17 g, 17 g, Oral, Daily  •  pregabalin (LYRICA) cap 50 mg, 50 mg, Oral, BID  •  psyllium (METAMUCIL SMOOTH

## 2018-01-22 NOTE — PROGRESS NOTES
[facility]    Progress Note    Ren Quinones Patient Status:  Inpatient    1926 MRN [id number]   Location [facility] 4W/SW/SE Attending Arsh Valdez MD   Hosp Day # 9 PCP Poonam Curry MD       Subjective:   Cristobal Nobles 01/19/2018    (H) 01/19/2018   CA 9.9 01/19/2018   ALB 2.5 (L) 01/18/2018   ALKPHO 30 (L) 01/18/2018   BILT 0.6 01/18/2018   TP 5.6 (L) 01/18/2018   AST 13 (L) 01/18/2018   ALT <5 (L) 01/18/2018   PTT >240.0 (HH) 01/14/2018   INR 1.2 01/15/2018   DD

## 2018-01-22 NOTE — PLAN OF CARE
CARDIOVASCULAR - ADULT     • Maintains optimal cardiac output and hemodynamic stability Progressing     • Absence of cardiac arrhythmias or at baseline Progressing           MUSCULOSKELETAL - ADULT     • Return mobility to safest level of function Progress

## 2018-01-23 NOTE — CONSULTS
[facility]    Report of Consultation    Raulito Bardales Patient Status:  Inpatient    1926 MRN [id number]   Location [facility] 4W/SW/SE Attending Wolfgang Hernandez MD   Hosp Day # 11 PCP Syeda Daniel MD     Date of Admissio pain.      * HISTORY:  Past Medical History:   Diagnosis Date   • BPH (benign prostatic hyperplasia)    • [facility]    • Cellulitis    • CKD (chronic kidney disease)    • Congestive heart disease ([facility]    • Diverticulosis of large intestine    • Oral Daily   Cholecalciferol (VITAMIN D) 1000 units tab 1,000 Units 1,000 Units Oral Daily   docusate sodium (COLACE) cap 100 mg 100 mg Oral BID   multivitamin (ADULT) tab 1 tablet 1 tablet Oral Daily   Pantoprazole Sodium (PROTONIX) EC tab 20 mg 20 mg Ora Moderately rhonchi bilaterally  Cardiac: regular rate and rhythm ; normal S, S2 ;   Murmurs--none appreciated  Abdomen: soft, non-tender, non-distended, no organomegaly noted, no masses  Genitourinary:    FLANKS   --nontender bilaterally with no palpable fl >60 >60   GFRNAA 50* 38* 52* 54* 51* 51* 44* 46* 52* 57* 47* 54* >60 >60 >60       Urinalysis Results (last three years):    Recent Labs   11/15/16  1435 04/27/17  1549 06/14/17  2113 01/12/18  100 [facility] However underlying endobronchial lesion should be excluded via bronchoscopy when clinically appropriate. Bronchial wall thickening suggestive of mild chronic airway inflammation. Dilated main pulmonary artery can be seen with pulmonary hypertension.   Mod changing Richard catheter, decision made to insert a new Richard catheter because of all of the above, a 18 [place] coudé Richard catheter. I remove existing Richard catheter by deflating balloon and slowly removing it.   I then prepped and draped the patient usual problems. RECOMMENDATIONS AND TREATMENT PLAN      1. Richard catheter to gravity    2. Restart finasteride 5 mg daily    3. Restart oxybutynin but at lower dose; I will order 5 mg twice daily    4. Irrigate Richard catheter as needed    5.   Consider r

## 2018-01-23 NOTE — CM/SW NOTE
Updated information has been sent to [facility]. Hopeful for discharge tomorrow 1/24. [facility] is aware.       Magda Collins, [facility] ext [phone]

## 2018-01-23 NOTE — SLP NOTE
SPEECH DAILY NOTE - INPATIENT    ASSESSMENT & PLAN   ASSESSMENT      Pt seen upright in chair with current diet of pureed/nectar-thick liquids (breakfast tray) for monitoring of diet tolerance.  Swallowing precautions/strategies discussed and demonstrated w pureed consistency (no coughing, no throat clearing and clear vocal quality). Pt with one episode of weak cough on nectar via tsp.      GOAL PROGRESSING     Goal #2 The patient/family/caregiver will demonstrate understanding and implementation of aspiration

## 2018-01-23 NOTE — PROGRESS NOTES
[facility]    Progress Note    Tammie Driscoll Patient Status:  Inpatient    1926 MRN [id number]   Location [facility] 4W/SW/SE Attending Joseph Gonzalez MD   Hosp Day # 11 PCP Beatris Snyder MD     Subjective:   Brain Cooler

## 2018-01-23 NOTE — PROGRESS NOTES
[place] [facility] - [facility]    Progress Note    Corene Cornea Patient Status:  Inpatient    1926 MRN [id number]   Location [facility] 4W/SW/SE Attending Maribel Jackson MD   Hosp Day # 11 PCP Kim Jenkins MD       Subjective:   Kar Wilhelm 01/19/2018   HCT 34.3 (L) 01/19/2018    01/19/2018   CREATSERUM 0.95 01/19/2018   BUN 22 (H) 01/19/2018    (H) 01/19/2018   K 4.0 01/19/2018    (H) 01/19/2018   CO2 29 01/19/2018    (H) 01/19/2018   CA 9.9 01/19/2018   ALB 2.5 (L)

## 2018-01-23 NOTE — PROGRESS NOTES
Cardiology progress note  1/23/2018    Objective finding BP stable   Subjective finding better feeling        Current Facility-Administered Medications:  apixaban (ELIQUIS) tab 5 mg 5 mg Oral BID Faustino Meyers MD 5 mg at 01/22/18 0847    vancomycin (FIR tab 20 mg 20 mg Oral QAM AC Faustino Meyers MD 20 mg at 01/22/18 0845    PEG 3350 (MIRALAX) powder packet 17 g 17 g Oral Daily Faustino Meyers MD 17 g at 01/15/18 0957    pregabalin (LYRICA) cap 50 mg 50 mg Oral BID Faustino Meyers MD 50 mg at 01/22/18 by echo , lasix held creat improvrd, continue lasix to 20 PO BID    4) hypotension: improved         Thank you for allowing me to participate in the care of your patient.     Jorge Luis Cristina MD   General Cardiology & Advanced Heart Failure, Cardiac Transplant a

## 2018-01-24 NOTE — WOUND PROGRESS NOTE
Wound Care Services  Attempting to follow up on the pt,. the pt. is eating lunch, being fed. The pt. is on a Isogel air mattress.

## 2018-01-24 NOTE — PROGRESS NOTES
[facility]    Progress Note    Pari Servant Patient Status:  Inpatient    1926 MRN [id number]   Location [facility] 4W/SW/SE Attending Fernando Scott MD   Hosp Day # 12 PCP Taylor Jj MD       Subjective:   Pablo Lam medications are the following:  BiPAP 10/5, O2 4 L nasal cannula, tramadol 50 mg every 8 hours as needed severe pain, Tylenol 500 mg every 6 hours as needed mild to moderate pain, enteric-coated aspirin 81 mg daily, amiodarone 100 mg daily, albuterol inhale

## 2018-01-24 NOTE — PROGRESS NOTES
Patient seen in follow up. In nad, history limited.    01/24/18  0941   BP: 112/58   Pulse:    Resp:    Temp:        Intake/Output Summary (Last 24 hours) at 01/24/18 1649  Last data filed at 01/24/18 1525   Gross per 24 hour   Intake              480 Pantoprazole Sodium (PROTONIX) EC tab 20 mg 20 mg Oral QAM AC   PEG 3350 (MIRALAX) powder packet 17 g 17 g Oral Daily   pregabalin (LYRICA) cap 50 mg 50 mg Oral BID   psyllium (METAMUCIL SMOOTH TEXTURE) 28 % packet 1 packet 1 packet Oral BID       No presc

## 2018-01-24 NOTE — PROGRESS NOTES
[facility]    Progress Note    Darien Alexander Patient Status:  Inpatient    1926 MRN [id number]   Location [facility] 4W/SW/SE Attending Laura Gary MD   Hosp Day # 12 PCP Marizol Aragon MD     Subjective:   Lowell Eaton

## 2018-01-24 NOTE — CM/SW NOTE
The pt. Has been approved to discharge back to [facility] today 1/24 at 230p, via ambulance due to inability to sit and O2 need. The pt's son has been notified.       Report Luceroien 2, [facility]

## 2018-01-25 NOTE — DISCHARGE SUMMARY
[facility]    PATIENT'S NAME: Matt Sanford PHYSICIAN: Minna Nieves MD   PATIENT ACCOUNT#:   [de-identified]    LOCATION:  [address] [place] [address] RECORD #:   [id number]       YOB: 1926  ADMISSION DATE:       01/12 daily, Proscar 5 mg daily, lidocaine patch, metoprolol 50 mg a day, Prilosec 20 mg daily, potassium chloride extended release 20 mEq daily, Lyrica 50 mg b.i.d. ALLERGIES:  No known allergies.       FAMILY HISTORY:  His mother, maternal uncle, and brother However, the patient does not like this and the son has stated that he will allow his father to have a general diet and would sign knowing that he has a risk of threat to life and limb. The patient gradually improved.   The patient is being sent back to [facility]

## 2018-01-30 NOTE — PROGRESS NOTES
HPI: Kandis Del Rio  : 1926  Age 80year old  male patient is admitted to [facility] for BRONWYN    Chief complaint: Initial APRN assessment and f/u pneumonia, PE/DVT, influenza, acute CHF    This is a 79 yo male who was residing a infection)      Past Surgical History:  No date: APPENDECTOMY  No date: CATARACT  No date: CHOLECYSTECTOMY  No date: EXCIS LUMBAR DISK,ONE LEVEL  No date: KNEE ARTHROSCOPY Bilateral  No date: TONSILLECTOMY  Family History   Problem Relation Age of Onset 2, calm and cooperative      ASSESSMENT/PLAN    1. Aspiration pneumonia/acute respiratory failure  - completed antibiotics in hospital  - oxygen as needed  - albuterol prn  - BiPAP at night  - in- house pulmonary consult     2.  PE/DVT  - L posterior tibial

## 2018-02-09 PROBLEM — J96.01 ACUTE RESPIRATORY FAILURE WITH HYPOXIA (HCC): Status: ACTIVE | Noted: 2018-01-01

## 2018-02-09 PROBLEM — R65.20 SEVERE SEPSIS (HCC): Status: ACTIVE | Noted: 2018-01-01

## 2018-02-09 PROBLEM — J18.9 NOSOCOMIAL PNEUMONIA: Status: ACTIVE | Noted: 2018-01-01

## 2018-02-09 PROBLEM — N30.00 ACUTE CYSTITIS WITHOUT HEMATURIA: Status: ACTIVE | Noted: 2018-01-01
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
## 2018-02-09 PROBLEM — A41.9 SEVERE SEPSIS (HCC): Status: ACTIVE | Noted: 2018-01-01

## 2018-02-09 PROBLEM — N28.9 ACUTE RENAL INSUFFICIENCY: Status: ACTIVE | Noted: 2018-01-01

## 2018-02-09 NOTE — ED PROVIDER NOTES
Patient Seen in: [facility] AND [facility] Emergency Department    History   Patient presents with:  Dyspnea ANURAG SOB (respiratory)    Stated Complaint: SOB    HPI    70-year-old male DNR presents from skilled nursing facility with respiratory distress.   Fred 23   Wt 99.2 kg   SpO2 100%   BMI 33.25 kg/m²         Physical Exam   Constitutional: He appears listless. On bipap   HENT:   Head: Normocephalic and atraumatic. Eyes: Conjunctivae are normal. Pupils are equal, round, and reactive to light.    Nohemi Labrum following:     Lactic Acid 3.2 (*)     All other components within normal limits   CBC W/ DIFFERENTIAL - Abnormal; Notable for the following:     HGB 13.4 (*)     MCH 26.6 (*)     MCHC 31.0 (*)     RDW 18.3 (*)     MPV 10.4 (*)     All other components wit Pulmonology has been consulted. Imaging:   Xr Chest Ap Portable  (cpt=71045)    Result Date: 2/9/2018  PROCEDURE: XR CHEST AP PORTABLE (CPT=71010) TIME: 5:33 p.m.   COMPARISON: [facility], XR CHEST AP PORTABLE (CPT=71010), 1/21/2018, 6:24 specified. We recommend that you schedule follow up care with a primary care provider within the next three months to obtain basic health screening including reassessment of your blood pressure.     Medications Prescribed:  Current Discharge Medication Lis

## 2018-02-09 NOTE — ED NOTES
3 attempts at drawing pts blood, with no success at this time. ermd aware. pts son states that the pt had a picc line placed during his previous admission d/t poor venous access. Will continue to monitor.

## 2018-02-09 NOTE — PROGRESS NOTES
HPI: Dariana Fiore  : 1926  Age 80year old  male patient is admitted to [facility] for BRONWYN    Chief complaint: F/u pneumonia, PE/DVT, influenza, acute CHF & Hyperkalemia    This is a 79 yo male who was residing at [facility] • GI bleed    • Parkinson disease (HCC)    • Paroxysmal atrial fibrillation (HCC)    • Systolic heart failure (HCC)    • UTI (urinary tract infection)      Past Surgical History:  No date: APPENDECTOMY  No date: CATARACT  No date: CHOLECYSTECTOMY  No rylie Richard catheter  SKIN: Bruising throug BUE, pt has a soft bump on R dorsal forearm about 6x6 cm  NEUROLOGIC: follows commands  PSYCHIATRIC: A/O x 2, calm and cooperative      ASSESSMENT/PLAN  K level 5.4, D/C KCL, patient is on spironolactone and Lisinopril potassium chloride 10 mEq daily

## 2018-02-10 NOTE — CONSULTS
[facility]    Consult Note    Date:  2/9/2018  Date of Admission:  2/9/2018      Chief Complaint:   Kamilah Huertas is a(n) 80year old male with decreased saturations.     HPI:   The patient has a history of Parkinson's, pulmonary embolism, today by the son Armadno Gomez who lives locally.   Telephone number [phone], no tobacco, no alcohol, retired maintenance  Smoking status: Never Smoker                                                              Smokeless tobacco: Never Used PTT 34.7 02/09/2018   INR 1.6 02/09/2018   MG 2.6 02/09/2018   TROP 0.02 02/09/2018     Chest x-ray–increased bibasilar infiltrates and there is chronic left basilar consolidation    Assessment/Plan:   1.   Acute on chronic respiratory failure–the patient

## 2018-02-10 NOTE — SEPSIS REASSESSMENT
[facility]      Sepsis Reassessment Note    BP (!) 166/128   Pulse 77   Temp 98.9 °F (37.2 °C) (Rectal)   Resp 30   Wt 99.2 kg   SpO2 96%   BMI 33.25 kg/m²      7:38 PM    Cardiac:  Regularity: Regular  Rate: Normal  Heart Sounds: S1,S2

## 2018-02-10 NOTE — PLAN OF CARE
Problem: CARDIOVASCULAR - ADULT  Goal: Maintains optimal cardiac output and hemodynamic stability  INTERVENTIONS:  - Monitor vital signs, rhythm, and trends  - Monitor for bleeding, hypotension and signs of decreased cardiac output  - Evaluate effectivenes handout, if applicable  - Encourage broncho-pulmonary hygiene including cough, deep breathe, Incentive Spirometry  - Assess the need for suctioning and perform as needed  - Assess and instruct to report SOB or any respiratory difficulty  - Respiratory Ther cultural and social influences on pain and pain management  - Manage/alleviate anxiety  - Utilize distraction and/or relaxation techniques  - Monitor for opioid side effects  - Notify MD/LIP if interventions unsuccessful or patient reports new pain  - Anti

## 2018-02-10 NOTE — CM/SW NOTE
MD order received regarding hospice. The pt. Is a long term resident at [facility] in a medicaid bed. [facility] will follow up with the pt. And his son to discuss hospice options.       Rodrick Pena

## 2018-02-10 NOTE — PROGRESS NOTES
[facility]    Progress Note      Assessment and Plan:   1. Acute on chronic respiratory failure–the patient has chronic left basilar consolidation with recent aspiration pneumonia.   The family and patient have decided to never placed feedi Abdomen nontender, without hepatosplenomegaly and no mass appreciable. Extremities without clubbing cyanosis with 1+ lower extremity edema.    Neurologic notable for the marked bradykinesia and marked limitation of movement to the extremities     Result

## 2018-02-10 NOTE — HOME CARE LIAISON
Met with son Kayla Gonzalez this am who is POA. Hospice philosophy explained and levels of care. Son states that he would like to speak to his father (patient) and his two brothers who live out of town before making any decisions.   Hospice written information an

## 2018-02-10 NOTE — PROGRESS NOTES
120 [facility] dosing service    Initial Pharmacokinetic Consult for Vancomycin Dosing     Luisa Lozano is a 80year old male admitted on 2/9 who is being treated for pneumonia.   Pharmacy has been asked to dose Vancomycin by Dr. Junie Barker    He has No Known A 15-20 ug/mL. 3.  Pharmacy will need BUN/Scr daily while on Vancomycin to assess renal function. 4.  Pharmacy will follow and monitor renal function changes, toxicity and efficacy. Pharmacy will continue to follow him.   We appreciate the opportunit

## 2018-02-10 NOTE — H&P
[facility]    PATIENT'S NAME: Mana Nur   ATTENDING PHYSICIAN: Joe Olson MD   PATIENT ACCOUNT#:   [de-identified]    LOCATION:  [address] [place] [address] RECORD #:   [id number]       YOB: 1926  ADMISSION DATE:       02/09/2 5000 international units daily. He is on a pureed diet with nectar-thick liquid. ALLERGIES:  No known allergies. FAMILY HISTORY:  Mother, maternal uncle, and brother have had MIs. SOCIAL HISTORY:  He does not smoke or drink.   He lives at [place]

## 2018-02-11 NOTE — PROGRESS NOTES
[facility]    Progress Note    Mago Marie Patient Status:  Inpatient    1926 MRN [id number]   Location [facility] 4W/SW/SE Attending Heather Spain MD   Hosp Day # 2 PCP Christophe Roberts MD       Subjective:   Rosamaria Marcus (L) 02/10/2018   PTT 34.7 02/09/2018   INR 1.6 (H) 02/09/2018   DDIMER 3.01 (H) 01/12/2018   MG 2.6 (H) 02/09/2018   TROP 0.02 02/09/2018       No procedure found. Xr Chest Ap Portable  (cpt=71045)    Result Date: 2/11/2018  CONCLUSION:  1.  No significa

## 2018-02-11 NOTE — PROGRESS NOTES
[facility]    Progress Note      Assessment and Plan:   1. Acute on chronic respiratory failure– recurrent aspiration pneumonitis. The chest x-ray looks a little better to me. The patient certainly looks more comfortable.   The family did HCT 32.7 02/11/2018    02/11/2018   CREATSERUM 1.05 02/11/2018   BUN 41 02/11/2018    02/11/2018   K 3.7 02/11/2018    02/11/2018   CO2 25 02/11/2018    02/11/2018   CA 8.9 02/11/2018     Chest x-ray– slight improvement in bibas

## 2018-02-11 NOTE — PLAN OF CARE
Problem: CARDIOVASCULAR - ADULT  Goal: Maintains optimal cardiac output and hemodynamic stability  INTERVENTIONS:  - Monitor vital signs, rhythm, and trends  - Monitor for bleeding, hypotension and signs of decreased cardiac output  - Evaluate effectivenes SKIN/TISSUE INTEGRITY - ADULT  Goal: Skin integrity remains intact  INTERVENTIONS  - Assess and document risk factors for pressure ulcer development  - Assess and document skin integrity  - Monitor for areas of redness and/or skin breakdown  - Initiate int

## 2018-02-11 NOTE — PLAN OF CARE
Problem: CARDIOVASCULAR - ADULT  Goal: Maintains optimal cardiac output and hemodynamic stability  INTERVENTIONS:  - Monitor vital signs, rhythm, and trends  - Monitor for bleeding, hypotension and signs of decreased cardiac output  - Evaluate effectivenes to patient. He is aware and still refusing. Will try to use nasal mask instead of full face mask.     Problem: METABOLIC/FLUID AND ELECTROLYTES - ADULT  Goal: Electrolytes maintained within normal limits  INTERVENTIONS:  - Monitor labs and rhythm and assess affect risk of falls.   - [place] fall precautions as indicated by assessment.  - Educate pt/family on patient safety including physical limitations  - Instruct pt to call for assistance with activity based on assessment  - Modify environment to reduce ri

## 2018-02-12 PROBLEM — Z71.89 ADVANCE CARE PLANNING: Status: ACTIVE | Noted: 2018-01-01

## 2018-02-12 PROBLEM — Z71.89 GOALS OF CARE, COUNSELING/DISCUSSION: Status: ACTIVE | Noted: 2018-01-01

## 2018-02-12 NOTE — HOSPICE RN NOTE
Family has decided not to choose hospice at this time.   Son was given information material and instructed to call for any questions

## 2018-02-12 NOTE — CONSULTS
[address] Patient Status:  Inpatient    1926 MRN [id number]   Location [facility] 4W/SW/SE Attending Corazon Garcia MD   Hosp Day # 3 PCP Fabiano Louise MD     Date of Co bedside. Patient appears overall tachypneic, fatigued, with masked facies. He is alert and awake. Breathing appears labored currently on 02 NC with current symptoms of dypsnea. Denies cough or lightheadedness/dizziness.  Patient denies any current pain issu utilizing the no re-hospitalization order however Chidi Eaton knows that his dad wants to continue with medical treatments and 'go down fighting'.     Medical History:  Past Medical History:   Diagnosis Date   • BPH (benign prostatic hyperplasia)    • Cardiomyop mg, 20 mg, Oral, QAM AC  •  Oxybutynin Chloride (DITROPAN) tab 5 mg, 5 mg, Oral, BID  •  PEG 3350 (MIRALAX) powder packet 17 g, 17 g, Oral, Daily  •  potassium chloride (K-SOL) 40 meq/30 ml (10%) oral solution 10 mEq, 10 mEq, Oral, Daily  •  pregabalin (LY (H) 01/12/2018   MG 2.4 02/12/2018   TROP 0.02 02/09/2018       Imaging:  Xr Chest Ap Portable  (cpt=71045)    Result Date: 2/12/2018  CONCLUSION:  1. Mild cardiomegaly and mild pulmonary congestive changes. Persistent left base consolidation.  Worsening ri assess    Disposition: ongoing goals of care discussions    DNR CODE STATUS    [place] POLST form completed by Dr Nataly Griffin    Patient and family are agreeable to community palliative care services    Assessment/Recommendations:    Severe sepsis ([address]      Nosoc participate in the care of [facility]. Ketan Reyna MD, Mattie Bates M.D. [id number]  2/12/2018  10:23 AM

## 2018-02-12 NOTE — CM/SW NOTE
SW received order for community palliative care. Pt's son, Nilay Horn requested list for palliative agencies. Pt's son stated he will review list and contact SW regarding palliative service to follow at [facility]. SW to follow up w/ palliative agency choice.

## 2018-02-12 NOTE — CM/SW NOTE
CTL clinical note: Patient is a BPCI readmission enrolled in [facility] program on 1/17/18 under . There are 70 days remaining in current episode.

## 2018-02-12 NOTE — PALLIATIVE CARE NOTE
Initial palliative care visit with patient in room. No family bedside. Pt AxO x 2. States he is having some abdominal pain but cannot describe or elaborate further. Pt reports he can't remember when he had last BM.   Pt breathing tachy and appears mild

## 2018-02-12 NOTE — PROGRESS NOTES
Pulmonary/Critical Care Follow Up Note    HPI:   Irving Baird is a 80year old male with Patient presents with:  Dyspnea ANURAG SOB (respiratory)      PCP Vladimir Braxton MD  Admission Attending Ángel Howell 15 Day #3   c/o cough  Low appeti Oral, BID  •  psyllium (METAMUCIL SMOOTH TEXTURE) 28 % packet 1 packet, 1 packet, Oral, BID  •  TraMADol HCl (ULTRAM) tab 50 mg, 50 mg, Oral, Q12H PRN  •  norepinephrine (LEVOPHED) 4 mg/250 ml premix infusion, 0.5-30 mcg/min, Intravenous, Continuous PRN  • CL  118*  120*  122*   CO2  25  25  26           ASSESSMENT/PLAN:     1.  Acute on chronic respiratory failure  Recurrenet aspiration  Loud central congestion  Plan  Abx   Nebs   Aspiration precautions        2.    Marked pyuria  Plan cont abx     3.  Ac

## 2018-02-12 NOTE — PROGRESS NOTES
[facility]    Progress Note    Pari Servant Patient Status:  Inpatient    1926 MRN [id number]   Location [facility] 4W/SW/SE Attending Fernando Scott MD   Hosp Day # 3 PCP Taylor Jj MD       Subjective:   Salyer Grief BILT 0.5 02/10/2018   TP 5.7 (L) 02/10/2018   AST 19 02/10/2018   ALT 6 (L) 02/10/2018   PTT 34.7 02/09/2018   INR 1.6 (H) 02/09/2018   DDIMER 3.01 (H) 01/12/2018   MG 2.4 02/12/2018   TROP 0.02 02/09/2018       No procedure found.     Xr Chest Ap Portabl

## 2018-02-12 NOTE — PLAN OF CARE
CARDIOVASCULAR - ADULT    • Maintains optimal cardiac output and hemodynamic stability Progressing    • Absence of cardiac arrhythmias or at baseline Progressing        METABOLIC/FLUID AND ELECTROLYTES - ADULT    • Electrolytes maintained within normal davis

## 2018-02-13 NOTE — CM/SW NOTE
SW spoke w/ pt's son Ping Durand [phone], who requested referral to [facility] for palliative services to follow at [facility]. SW made referral to [facility] via [employer].  Palliative order in.     Dov Zapata, [address]

## 2018-02-13 NOTE — PROGRESS NOTES
[facility]    Progress Note    Urbano Bhatia Patient Status:  Inpatient    1926 MRN [id number]   Location [facility] 4W/SW/SE Attending Grabiel Rojo MD   Hosp Day # 4 PCP Leslie Damon MD       Subjective:   Elizabeth Farr 02/10/2018   TP 5.7 (L) 02/10/2018   AST 19 02/10/2018   ALT 6 (L) 02/10/2018   PTT 34.7 02/09/2018   INR 1.6 (H) 02/09/2018   DDIMER 3.01 (H) 01/12/2018   MG 2.4 02/12/2018   TROP 0.02 02/09/2018       No procedure found.     Xr Chest Ap Portable  (cpt=710

## 2018-02-13 NOTE — PROGRESS NOTES
[place] [facility] - [facility]    Progress Note    Lala Lozoya Patient Status:  Inpatient    1926 MRN [id number]   Location [facility] 4W/SW/SE Attending Urban Bermeo MD   Hosp Day # 4 PCP Meng Pedroza MD     Subjective:     Unable t may suggest worsening right-sided pneumonia. Followup studies are advised. Ana cShuster.  Sarah Thomas MD  2/13/2018

## 2018-02-13 NOTE — PROGRESS NOTES
[facility] dosing service    Follow-up Pharmacokinetic Consult for Vancomycin Dosing     Ren Quinones is a 80year old male admitted on 2/9 who is being treated for pneumonia. Patient is on day 4 of Vancomycin 1.25 gm IV Q 24 hours.   Goal trough is Status: None (Preliminary result)   Collection Time: 02/09/18 6:41 PM   Result Value Ref Range   Blood Culture Result No Growth 2 Days N/A       Radiology:  Chest Xray 2/12  CONCLUSION:   1. Mild cardiomegaly and mild pulmonary congestive changes.  Persiste

## 2018-02-13 NOTE — CONSULTS
[place] [facility] - [facility]  Palliative Care Follow Up    Jeaneth Sanders Patient Status:  Inpatient    1926 MRN [id number]   Location [facility] 4W/SW/SE Attending Aaron Marquez MD   Hosp Day # 4 PCP Mehran Vail MD     Date of Consult: (Concentrate) concentrated solution 5 mg, 5 mg, Oral, Q2H PRN  •  albuterol sulfate (VENTOLIN) (2.5 MG/3ML) 0.083% nebulizer solution 2.5 mg, 2.5 mg, Nebulization, Once  •  albuterol sulfate (VENTOLIN) (2.5 MG/3ML) 0.083% nebulizer solution 2.5 mg, 2.5 mg, 0.9 % 500 mL IVPB, 15 mg/kg (Adjusted), Intravenous, Q24H  •  Normal Saline Flush 0.9 % injection 3 mL, 3 mL, Intravenous, PRN    No current outpatient prescriptions on file. Hematology:  Lab Results  Component Value Date   WBC 10.3 02/13/2018   HGB 10. information: okay to share with Dolores Jimenez  Patient's decision making preferences: self with his son Dolores Jimenez  Code status: DNR CODE STATUS  Have advanced directives been discussed with patient or healthcare power of : Yes                 Pre-existing DN

## 2018-02-14 NOTE — PROGRESS NOTES
[facility]    Progress Note    Amanda Nobles Patient Status:  Inpatient    1926 MRN [id number]   Location [facility] 4W/SW/SE Attending Peter Cuevas MD   Hosp Day # 5 PCP Paulina Mcgarry MD       Subjective:   Lyn Valdes 02/10/2018   ALKPHO 42 02/10/2018   BILT 0.5 02/10/2018   TP 5.7 (L) 02/10/2018   AST 19 02/10/2018   ALT 6 (L) 02/10/2018   PTT 34.7 02/09/2018   INR 1.6 (H) 02/09/2018   DDIMER 3.01 (H) 01/12/2018   MG 2.4 02/12/2018   TROP 0.02 02/09/2018       No proce

## 2018-02-14 NOTE — CONSULTS
[place] [facility]  Palliative Care Follow Up    Sally Davila Patient Status:  Inpatient    1926 MRN [id number]   Location [facility] 4W/SW/SE Attending Makayla Ames MD   Hosp Day # 5 PCP Amelia Garcia MD     Date of Consult: gurgling noted during respirations  RN tried suctioning however Jatin Armijo told them no more suctioning since he doesn't like it      Review of Systems:  Pertinent items are noted in subjective.   Loud gurgling with respirations      Allergies:  No Known Allergi 0.04 Units/min, Intravenous, Continuous PRN  •  Piperacillin Sod-Tazobactam So (ZOSYN) 3.375 g in dextrose 5 % 100 mL ADD-vantage, 3.375 g, Intravenous, Once **FOLLOWED BY** Piperacillin Sod-Tazobactam So (ZOSYN) 3.375 g in dextrose 5 % 100 mL ADD-vantage, resting, loud gurgling noted    Palliative Performance Scale : 30%    Palliative Care Goals of Care:  Discussed with Patient and Family: YES  Patient's preference about sharing medical information: okay to share with his son Nandini Baxter  Patient's decision [facility] Francine Aguirre: While Richi Carroll is decisional, he wants to continue with current medical treatments. When Richi Carroll loses decisional capacity, Francine Aguirre told me that he is leaning towards pure comfort care utilizing hospice services.  I suspect that when other medications sto

## 2018-02-14 NOTE — PLAN OF CARE
Patient continues to have moist non-productive cough, declined oral suctioning for secretions. Patient having a very difficult time swallowing even his thickened liquids including medications. Patient denies pain.  Patient repositioned PRN, heels elevated a

## 2018-02-15 NOTE — WOUND PROGRESS NOTE
WOUND CARE NOTE      PLAN   Recommendations:  Dietary consult for recommendations for nutrition to optimize wound healing  Turn schedules  Specialty bed: ISOGEL W PUMP  Heels elevated using pillows, heel wedge or heel boots to offload heels  Use of lift

## 2018-02-15 NOTE — PROGRESS NOTES
Informed Dr Mario Dwyer that patient was unable to swallow medications. Did now want IV metoprolol, questioned amiodarone drip but patient would have to transfer to tele for that.

## 2018-02-15 NOTE — PROGRESS NOTES
[facility] dosing service    Follow-up Pharmacokinetic Consult for Vancomycin Dosing     Denton Kraus is a 80year old male admitted on 2/9 who is being treated for pneumonia. Patient is on day 6 of Vancomycin 1250mg IV q24h.   Goal trough is 15-20} 2. BLOOD CULTURE Status: None   Collection Time: 02/09/18 6:41 PM   Result Value Ref Range   Blood Culture Result No Growth 5 Days N/A           Based on the above: 1. Continue Vancomycin at 1000mg IVPB q 24 hours.  (based on Trough of 21.8 ug/mL, pharm

## 2018-02-15 NOTE — PROGRESS NOTES
[facility]    Progress Note    Raulito Bardales Patient Status:  Inpatient    1926 MRN [id number]   Location [facility] 4W/SW/SE Attending Wolfgang Hernandez MD   Hosp Day # 6 PCP Syeda Daniel MD       Subjective:   Sally Yoon 02/14/2018   ALB 2.5 (L) 02/10/2018   ALKPHO 42 02/10/2018   BILT 0.5 02/10/2018   TP 5.7 (L) 02/10/2018   AST 19 02/10/2018   ALT 6 (L) 02/10/2018   PTT 34.7 02/09/2018   INR 1.6 (H) 02/09/2018   DDIMER 3.01 (H) 01/12/2018   MG 2.4 02/12/2018   TROP 0.02

## 2018-02-15 NOTE — CONSULTS
[place] [facility] - [facility]  Palliative Care Follow Up    Titi Avalos Patient Status:  Inpatient    1926 MRN [id number]   Location [facility] 4W/SW/SE Attending Joaquin Beach MD   Hosp Day # 6 PCP Debra Pearson MD     Date of Consult: (PRINIVIL,ZESTRIL) tab 2.5 mg, 2.5 mg, Oral, Daily  •  Metoprolol Succinate ER (Toprol XL) 24 hr tab 50 mg, 50 mg, Oral, Daily  •  Pantoprazole Sodium (PROTONIX) EC tab 20 mg, 20 mg, Oral, QAM AC  •  Oxybutynin Chloride (DITROPAN) tab 5 mg, 5 mg, Oral, BID (cpt=71045)    Result Date: 2/14/2018  CONCLUSION:  1. Interval worsening in the chest with worsening consolidation in the right upper lobe suggesting worsening pneumonia. Persistent moderate left base consolidation with possible left pleural effusion.  Erik Failing Performance Scale 30%  -    Emotion support provided to patient/family today: Yes      Palliative Care Follow Up:    A total of 35 minutes were spent on this consult, which included all of the following:direct face to face contact, history taking, physical

## 2018-02-15 NOTE — PLAN OF CARE
Patient continues to have difficulty with swallowing thickened liquids or pureed food. Takes a long time to swallow and usually coughs. Still has noisy moist breathing, patient refusing oral suctioning.  Meds held in pm due to increased difficulty swallowin

## 2018-02-16 NOTE — PROGRESS NOTES
02/16/18 0156   Clinical Encounter Type   Visited With Family   Routine Visit Introduction   Continue Visiting No   Crisis Visit Death   Referral From Nurse   Referral To    Patient Spiritual Encounters   Spiritual Assessment Completed 2   Famil

## 2018-02-16 NOTE — PROGRESS NOTES
[facility]    Progress Note      Assessment and Plan:   1. Acute on chronic respiratory failure– recurrent aspiration pneumonitis. The patient is declining. He cannot swallow safely. The family and the patient had not wanted tubes.   He

## 2018-02-16 NOTE — PLAN OF CARE
Pt  at [address]. Resusitation not attempted as pt was DNR.     Time of Death 5  Family Notified YES Name and Shima Skipper  Dr. Jigar Castillo of [facility] Notified Yes (get Rep Name and Case Number) Mya Love [id number]     contacted if applica

## 2018-03-19 NOTE — DISCHARGE SUMMARY
[facility]    PATIENT'S NAME: Kendell Foster PHYSICIAN: Sade Dwyer MD   PATIENT ACCOUNT#:   [de-identified]    LOCATION:  [address] [place] [address] RECORD #:   [id number]       YOB: 1926  ADMISSION DATE:       02/09 BiPAP 10/5, most of the time he would refuse O2 of 4L nasal cannula, tramadol 50 mg q.8 h. p.r.n., Tylenol 500 mg q.6 h. p.r.n., enteric-coated aspirin 81 mg a day, amiodarone 100 mg daily, albuterol inhaler, Eliquis 5 mg b.i.d., Lyrica 50 mg b.i.d., lisin Parkinson's, dysphagia, and will continue aspirating. They did not want a feeding tube. Eventually, the patient was placed on palliative care, however, it was not long within making that decision that the patient finally  on 2018.   Alis Troncoso

## 2024-09-17 NOTE — PROGRESS NOTES
Pulmonary/Critical Care Follow Up Note    HPI:   Pari Ly is a 80year old male with Patient presents with:  Dyspnea ANURAG SOB (respiratory)      PCP Taylor Jj MD  Admission Attending Ángel León 15 Day #5   c/o cough  Mild SOB a (K-SOL) 40 meq/30 ml (10%) oral solution 10 mEq, 10 mEq, Oral, Daily  •  pregabalin (LYRICA) cap 50 mg, 50 mg, Oral, BID  •  psyllium (METAMUCIL SMOOTH TEXTURE) 28 % packet 1 packet, 1 packet, Oral, BID  •  TraMADol HCl (ULTRAM) tab 50 mg, 50 mg, Oral, Q12 GFRAA  >60  >60  >60   GFRNAA  >60  >60  >60   CA  9.2  9.2  9.5   NA  151*  153*  154*   K  3.4  3.4  3.5  3.9  3.9   CL  120*  122*  122*   CO2  25  26  25           ASSESSMENT/PLAN:     1.  Acute on chronic respiratory failure  Recurrenet aspiration Detail Level: Detailed Send Procedure Quote As Charge: No

## (undated) NOTE — LETTER
[address] [facility], [facility] 121     I agree to have a Peripherally Inserted Central Catheter (PICC) placed in my arm.      1. The PICC insertion procedure, care, maintenance, risks, benefits, and complications Statement of Physician: My signature below affirms that prior to the time of the PICC line insertion, I have explained to the patient and/or his/her legal representative, the risks and benefits involved in the proposed treatment and any reasonable alternat

## (undated) NOTE — LETTER
[address] [facility], [facility] 121     I agree to have a Peripherally Inserted Central Catheter (PICC) placed in my arm.      1. The PICC insertion procedure, care, maintenance, risks, benefits, and complications Statement of Physician: My signature below affirms that prior to the time of the PICC line insertion, I have explained to the patient and/or his/her legal representative, the risks and benefits involved in the proposed treatment and any reasonable alternat

## (undated) NOTE — IP AVS SNAPSHOT
Patient Demographics     Address  [address] [place] [address] Phone  [phone] [facility])  [phone] (Mobile) *Preferred* E-mail Address  [email or web address]      Emergency Contact(s)     Name Relation Home Work [address] [facility] Take 3.5 tablets by mouth 3 (three) times daily. docusate sodium 100 MG Caps  Commonly known as:  COLACE  Next dose due:  1/24 evening      Take 100 mg by mouth 2 (two) times daily.           finasteride 5 MG Tabs  Commonly known as:  PROSCAR  Nex spironolactone 25 MG Tabs  Commonly known as:  ALDACTONE  Next dose due:  1/25 morning      Take 25 mg by mouth daily. TraMADol HCl 50 MG Tabs  Commonly known as:  ULTRAM      Take 50 mg by mouth every 6 (six) hours as needed for Pain.           va [id number] amiodarone HCl (PACERONE) tab 200 mg 01/24/18 0935 Given      [id number] apixaban (ELIQUIS) tab 5 mg 01/23/18 1959 Given      [id number] apixaban (ELIQUIS) tab 5 mg 01/24/18 0935 Given      [id number] aspirin chewable tab 81 mg 01/24/18 0935 Given Lab Results Last 24 Hours    No matching results found     Microbiology Results (All)     Procedure Component Value Units Date/Time    Urine Culture, Routine Once [[id number]] Collected:  01/18/18 0717    Order Status:  Completed Lab Status:  Final result U Order Status:  Completed Lab Status:  Final result Updated:  01/13/18 0234    Specimen:  Other from Nasopharyngeal swab      Adenovirus PCR: Negative     Coronavirus 229E PCR: Negative     Coronavirus Hku1 PCR: Negative     Coronavirus Nl63 PCR: Negative HISTORY OF PRESENT ILLNESS:  This patient with history of Parkinson's and paroxysmal atrial fibrillation was noted to be hypotensive at the nursing home. The patient was transferred here for evaluation and treatment. The patient denied any pain.   Per son were intact. Mucosa was dry. NECK:  No masses. LUNGS:  Diffuse rhonchi, with crackles at bases. HEART:  Regular rate and rhythm. S1 and S2. No murmurs. EXTREMITIES:  No cyanosis or clubbing. One-plus edema. ABDOMEN:  Positive bowel sounds.   Soft, Dr. Rendon Ser. 2]    History of Present Illness:   Patient is a 80year old male who was admitted to the hospital for Respiratory distress:[PK.1]    History provided by Dr. Juvencio Terrazas by phone; by patient's son who is present in the room; by nurse Flo Brittle w • Systolic heart failure (HCC)    • UTI (urinary tract infection)       Past Surgical History:  No date: APPENDECTOMY  No date: CATARACT  No date: CHOLECYSTECTOMY  No date: EXCIS LUMBAR DISK,ONE LEVEL  No date: KNEE ARTHROSCOPY Bilateral  No date: Tenisha Rankin pregabalin (LYRICA) cap 50 mg 50 mg Oral BID   psyllium (METAMUCIL SMOOTH TEXTURE) 28 % packet 1 packet 1 packet Oral BID       Allergies:  No Known Allergies    ROS:   Genitourinary:  Negative for dysuria and hematuria  Gastrointestinal:[PK.1] Positive fo Abdomen: soft, non-tender, non-distended, no organomegaly noted, no masses  Genitourinary: FLANKS[PK.1]   --nontender bilaterally with no palpable flank masses[PK. 3]  BLADDER[PK.1]--nondistended[PK. 3]  Normal secondary sexual characteristics and normal p GFRAA >60 46* >60 >60 >60 >60 53* 56* >60 >60 57* >60 >60 >60 >60   GFRNAA 50* 38* 52* 54* 51* 51* 44* 46* 52* 57* 47* 54* >60 >60 >60       Urinalysis Results (last three years):    Recent Labs   11/15/16  1435 04/27/17  1549 06/14/17  2113 01/12/18  1432 atelectasis and volume loss within the left hemithorax. This may be secondary to mucous plugging. However underlying endobronchial lesion should be excluded via bronchoscopy when clinically appropriate.   Bronchial wall thickening suggestive of mild chronic last changed 12 days ago, because catheter is no longer draining urine, because according to Dr. Kaylee Shook, nurses at nursing home have difficulty changing Richard catheter, decision made to insert a new Richard catheter because of all of the above, a [address] [place] [address] 7.  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   Up until now, patient and son have chosen to observe; I discussed this matter again with them now and they choose observation, in light of patient's age of 80 and multiple s through 1/24/2018 12:42 PM)     No notes of this type exist for this encounter.          Video Swallow Study Note - Video Swallow Study Notes      Video Swallow Study Note signed by BENEDICT Francis at 1/18/2018 12:54 PM  Version 1 of 1    Author: • Cellulitis    • CKD (chronic kidney disease)    • Congestive heart disease (HCC)    • Diverticulosis of large intestine    • Essential hypertension    • GI bleed    • Parkinson disease (HCC)    • Paroxysmal atrial fibrillation (HCC)    • Systolic heart f Oral Phase of Swallow (VFSS - Puree): Impaired  Bolus Retrieval (VFSS - Puree): Intact  Bilabial Seal (VFSS - Puree): Intact  Bolus Formation (VFSS - Puree): Impaired  Bolus Propulsion (VFSS - Puree):  Impaired  Triggered at: Valleculae  Laryngeal Penetrati Reviewed results with Radiologist; agreement verbalized.     Patient Experiencing Pain: No     FOLLOW UP  Treatment Plan/Recommendations: Dysphagia therapy  Number of Visits to Meet Established Goals: 3      Thank you for your referral.   If you have any qu Will continue f[MD.2]amily education[MD.1] as available. [MD.2]        Diet Recommendations - Solids: Puree  Diet Recommendations - Liquid: Nectar thick    Compensatory Strategies Recommended:  (pinched straw)  Aspiration Precautions: Upright position; Slow symptoms of aspiration with 100 % accuracy over 2 session(s). NO DIET UPGRADE/TRIAL d/t Pt's fatigue level during meals.    NOT APPROPRIATE[MD.2]         FOLLOW UP  Follow Up Needed: Yes  SLP Follow-up Date: 01/24/18  Number of Visits to Meet Established

## (undated) NOTE — IP AVS SNAPSHOT
[facility]            (For Outpatient Use Only) Initial Admit Date: 1/12/2018   Inpt/Obs Admit Date: Inpt: 1/12/18 / Obs: N/A   Discharge Date:    Mary Captain:  [de-identified]   MRN: [de-identified]   CSN: [id number]        ENCOUNTER  Patient Class Subscriber Name:  Stefani Sanchez :    Subscriber ID:  Pt Rel to Subscriber:    Hospital Account Financial Class: Medicare    2018

## (undated) NOTE — ED AVS SNAPSHOT
[facility] Emergency Department    [address]     [place] [address]    Phone:  [phone]    Fax:  [address]   MRN: [id number]    Department:  [facility] Emergency Department   Date of Visit:  6/14 aspect of your visit today. In an effort to constantly improve our service to you, we would appreciate any positive or negative feedback related to the care you received in our emergency department. Please call our [address] at [phone].   Your [place] contact you. Please make sure we have your correct phone number on file.       I certified that I have received a copy of the aftercare instructions; that these instructions have been explained to me; all questions pertaining to these instructions have bee

## (undated) NOTE — ED AVS SNAPSHOT
[facility] Emergency Department    [address]     [place] [address]    Phone:  [phone]    Fax:  [address]   MRN: [id number]    Department:  [facility] Emergency Department   Date of Visit:  4/27 Si tiene problemas para programar eddie miladys de seguimiento según lo indicado, llame al encargado de omero al [phone]. It is our goal to assure that you are completely satisfied with every aspect of your visit today.   In an effort to constantly impr list to your next doctor's appointment. Any imaging studies and labs completed today can be reviewed in your MyChart account. You may have had testing done that requires us to contact you. Please make sure we have your correct phone number on file.

## (undated) NOTE — ED AVS SNAPSHOT
[facility] Emergency Department    [address]     [address] [place] [address]    Phone:  [phone]    Fax:  [address]   MRN: [id number]    Department:  [facility] Emergency Department   Date of Visit:  4/27 and Class Registration line at [phone] or find a doctor online by visiting [email or web address].    IF THERE IS ANY CHANGE OR WORSENING OF YOUR CONDITION, CALL YOUR PRIMARY CARE PHYSICIAN AT ONCE OR RETURN IMMEDIATELY TO [address] [place] [address].     If

## (undated) NOTE — ED AVS SNAPSHOT
[facility] Emergency Department    [address]     [place] [address]    Phone:  [phone]    Fax:  [address]   MRN: [id number]    Department:  [facility] Emergency Department   Date of Visit:  6/14 and Class Registration line at [phone] or find a doctor online by visiting [email or web address].    IF THERE IS ANY CHANGE OR WORSENING OF YOUR CONDITION, CALL YOUR PRIMARY CARE PHYSICIAN AT ONCE OR RETURN IMMEDIATELY TO [address] [place] [address].     If

## (undated) NOTE — Clinical Note
January 19, 2017         Avelina Perez MD  [address] [address]      Patient: Ren Quinones   YOB: 1926   Date of Visit: 1/16/2017       Dear Dr. Ruby Dyer MD,    Thank you for referring Ren Quinones to my practice. cancer were proven (which would require biopsy and include risk of urosepsis), they do not intend on aggressive treatment (patient is 80years old and has Parkinson's). Patient and his son politely declined SOPHY today and chose observation for now.        I

## (undated) NOTE — MR AVS SNAPSHOT
[place]  [address]  [phone]               Thank you for choosing us for your health care visit with [address]   We are glad to serve you and happy to provide you with this summa Take 40 mg by mouth 2 (two) times daily. Take 40 mg in the am and 20 mg in the afternoon. Commonly known as:  LASIX           HYDROcodone-acetaminophen 5-325 MG Tabs   Take 1 tablet by mouth every 6 (six) hours as needed for Pain.    Commonly known as:  N Expires: 3/11/2017  6:21 PM    If you have questions, you can call [phone] to talk to our [facility] Staff. Remember, [employer] is NOT to be used for urgent needs. For medical emergencies, dial 911.            Visit [employer]

## (undated) NOTE — LETTER
[address] [facility], [facility] 121     I agree to have a Peripherally Inserted Central Catheter (PICC) placed in my arm.      1. The PICC insertion procedure, care, maintenance, risks, benefits, and complications Statement of Physician: My signature below affirms that prior to the time of the PICC line insertion, I have explained to the patient and/or his/her legal representative, the risks and benefits involved in the proposed treatment and any reasonable alternat

## (undated) NOTE — MR AVS SNAPSHOT
[place]ie  [address]  [phone]               Thank you for choosing us for your health care visit with Ashley Gilliam MD.  We are glad to serve you and happy to provide you with this summ Acetaminophen-Codeine #3 300-30 MG Tabs   Take 1 tablet by mouth every 4 (four) hours as needed for Pain.    Commonly known as:  TYLENOL #3           Albuterol Sulfate  (90 BASE) MCG/ACT Aers   Inhale 1 puff into the lungs every 4 (four) hours as ne Take 20 mg by mouth every morning before breakfast.   Commonly known as:  PRILOSEC           Oxybutynin Chloride 5 MG Tabs   Take 5 mg by mouth 3 (three) times daily. Commonly known as:  DITROPAN           PEG 3350 Pack   Take 17 g by mouth daily.    Comm

## (undated) NOTE — Clinical Note
No referring provider defined for this encounter. 01/18/2017        Patient: Darien Alexander   YOB: 1926   Date of Visit: 1/16/2017       Dear  Dr. Katelyn Rendon MD,      Thank you for referring Darien Alexander to my practice. not want SOPHY, as, if prostate cancer were proven (which would require biopsy and include risk of urosepsis), they do not intend on aggressive treatment (patient is 80years old and has Parkinson's).  Patient and his son politely declined SOPHY today and chose